# Patient Record
Sex: MALE | Race: WHITE | Employment: FULL TIME | URBAN - METROPOLITAN AREA
[De-identification: names, ages, dates, MRNs, and addresses within clinical notes are randomized per-mention and may not be internally consistent; named-entity substitution may affect disease eponyms.]

---

## 2018-07-06 ENCOUNTER — HOSPITAL ENCOUNTER (INPATIENT)
Facility: CLINIC | Age: 38
LOS: 4 days | Discharge: HOME | End: 2018-07-11
Attending: EMERGENCY MEDICINE | Admitting: PSYCHIATRY & NEUROLOGY
Payer: COMMERCIAL

## 2018-07-06 DIAGNOSIS — F10.220 ALCOHOL DEPENDENCE WITH UNCOMPLICATED INTOXICATION (H): ICD-10-CM

## 2018-07-06 LAB — ALCOHOL BREATH TEST: 0.32 (ref 0–0.01)

## 2018-07-06 PROCEDURE — 99285 EMERGENCY DEPT VISIT HI MDM: CPT | Performed by: EMERGENCY MEDICINE

## 2018-07-06 PROCEDURE — 82075 ASSAY OF BREATH ETHANOL: CPT | Performed by: EMERGENCY MEDICINE

## 2018-07-06 PROCEDURE — 99284 EMERGENCY DEPT VISIT MOD MDM: CPT | Mod: Z6 | Performed by: EMERGENCY MEDICINE

## 2018-07-06 NOTE — ED PROVIDER NOTES
History     Chief Complaint   Patient presents with     Alcohol Intoxication     Detox from alcohol, last drink prior to arrival, drinking vodka, 1/5 daily, has a bed on hold on 3A.   History was limited as patient is acutely intoxicated and somnolent.   HPI  Jorgito Florence is a 38 year old male with history of alcohol abuse who presents to the ED seeking detox from alcohol. Patient states he has been drinking vodka aproximately 1-2 L daily for weeks. He states he has been in detox previously, most recently in January in [place]. He denies any other medical problems.     PAST MEDICAL HISTORY  History reviewed. No pertinent past medical history.  PAST SURGICAL HISTORY  History reviewed. No pertinent surgical history.  FAMILY HISTORY  No family history on file.  SOCIAL HISTORY  Social History   Substance Use Topics     Smoking status: Current Every Day Smoker     Packs/day: 1.00     Smokeless tobacco: Not on file     Alcohol use Yes      Comment: 5th, bottle per day     MEDICATIONS  No current facility-administered medications for this encounter.      No current outpatient prescriptions on file.     ALLERGIES  Allergies   Allergen Reactions     Amoxicillin      Hydrocodone          I have reviewed the Medications, Allergies, Past Medical and Surgical History, and Social History in the Epic system.    Review of Systems   Unable to perform ROS: Mental status change       Physical Exam   BP: 136/90  Pulse: 116  Heart Rate: 116  Resp: 16  Weight: 59 kg (130 lb)  SpO2: 97 %      Physical Exam   Constitutional: He appears well-developed and well-nourished. No distress.   Heavily intoxicated and slurring his speech   HENT:   Head: Normocephalic and atraumatic.   Eyes: EOM are normal. Pupils are equal, round, and reactive to light. No scleral icterus.   Neck: Normal range of motion. Neck supple.   Cardiovascular: Tachycardia present.    Pulmonary/Chest: Effort normal.   Abdominal: Soft. There is no tenderness.   Skin: Skin is  warm and dry. No rash noted. He is not diaphoretic. No erythema. No pallor.       ED Course     ED Course     Procedures   6:48 PM  The patient was seen and examined by Dr. Bush in Room 9.                Critical Care time:  none             Labs Ordered and Resulted from Time of ED Arrival Up to the Time of Departure from the ED   ALCOHOL BREATH TEST POCT - Abnormal; Notable for the following:        Result Value    Alcohol Breath Test 0.316 (*)     All other components within normal limits   DRUG ABUSE SCREEN 6 CHEM DEP URINE ([facility])            Assessments & Plan (with Medical Decision Making)   This is a 38-year-old male patient presenting to the emergency room with complaints of alcohol abuse.  Currently he is significantly intoxicated with a blood alcohol of 0.316.  He is slurring his speech and is struggling to keep his eyes open.  He is otherwise in no significant distress with otherwise normal vital signs.  At this time he will be admitted to the detox service for continued care and management of alcohol abuse.    I have reviewed the nursing notes.    I have reviewed the findings, diagnosis, plan and need for follow up with the patient.    New Prescriptions    No medications on file       Final diagnoses:   Alcohol dependence with uncomplicated intoxication (H)     Darren ECHOLS, am serving as a trained medical scribe to document services personally performed by Raciel Bush MD, based on the provider's statements to me.      Raciel ECHOLS MD, was physically present and have reviewed and verified the accuracy of this note documented by Darren Castro.     7/6/2018   [facility], [place], EMERGENCY DEPARTMENT     Raciel Bush MD  07/06/18 8386

## 2018-07-06 NOTE — IP AVS SNAPSHOT
MRN:[id number]                      After Visit Summary   7/6/2018    Jorgito Florence    MRN: [id number]           Thank you!     Thank you for choosing [facility] for your care. Our goal is always to provide you with excellent care.        Patient Information     Date Of Birth          1980        Designated Caregiver       Most Recent Value    Caregiver    Will someone help with your care after discharge? no      About your hospital stay     You were admitted on:  July 7, 2018 You last received care in the:  [facility]    You were discharged on:  July 11, 2018       Who to Call     For medical emergencies, please call 911.  For non-urgent questions about your medical care, please call your primary care provider or clinic, None          Attending Provider     Provider Specialty    Raciel Bush MD Emergency Medicine    Yvette Wilson MD Psychiatry    Pavey, Lyle Alves MD Psychiatry       Primary Care Provider Fax #    Physician No Ref-Primary [phone]      Further instructions from your care team       Behavioral Discharge Planning and Instructions  THANK YOU FOR CHOOSING THE [address]  [phone]    Summary: You were admitted to Station 3A on 7/6/2018 for detoxification from alcohol.  A medical exam was performed that included lab work. You have met with a  and opted to begin [facility]'s  Treatment Program with sober housing in [place], [place].  Please take care and make your recovery a daily priority, Jorgito! It was a pleasure working with you and the treatment team wishes you the very best in your recovery! ~3A Treatment Team    Recommendation:  Outpatient Treatment, with sober housing, psychotherapy, sober support group engagement and active work with a sponsor or  through [facility] (see below for contact information).    Main Diagnosis:  Per Dr. Arteaga;  Alcohol use disorder severe    Major  Treatments, Procedures and Findings:  You were treated for alcohol detoxification using valium administered based on the [facility] protocol. You completed a chemical dependency assessment on 07/10/2018 with your , Matteo. You had labs drawn and those results were reviewed with you. Please take a copy of your lab work with you to your next primary care physician appointment.    Symptoms to Report:  If you experience more anxiety, confusion, sleeplessness, deep sadness or thoughts of suicide, notify your treatment team or notify your primary care physician. IF ANY OF THE SYMPTOMS YOU ARE EXPERIENCING ARE A MEDICAL EMERGENCY CALL 911 IMMEDIATELY.     Lifestyle Adjustment: Adjust your lifestyle to get enough sleep, relaxation, exercise and good nutrition. Continue to develop healthy coping skills to decrease stress and promote a sober living environment. Do not use mood altering substances including alcohol, illegal drugs or addictive medications other than what is currently prescribed.   Health Action Plan:  1.Create a daily schedule  2. Eat Healthy  3. Plan Enjoyable Sober Activities  4. Use Problem Solving Skills and Deal with Issues as they Arise.   5. Be Physically Active  6. Take your medications as prescribed  7. Get enough restful sleep  8. Practice Relaxation  9. Spend time with Supportive People  10. No use of alcohol, illegal drugs or addictive medications other than what is currently prescribed.   11.AA, NA Sponsor are excellent resources for support      Disposition: Pt discharged to [facility] Treatment Program with sober housing.    Follow-up Appointment:   Please make a follow up appointment to get a new order from your primary care provider if any prescriptions need to be refilled. We only send you home with a 30 day supply. You stated that you see Dr. Katz at [facility], his contact information is below:  Dr. Katz~ [address], [place] [address]  Phone: [phone]  [phone]    Resources:   AA/NA and Sponsors are excellent resources for support and you can find one at any support group meeting.   SMART Recovery - self management for addiction recovery:  www.smartrecovery.org  http://www.aastpaul.org/?topic=8  http://a[place].org/meetings/  http://www.nam[place].org/index.php/meeting-list-pdf  Pathways ~ A Health Crisis Resource & Support Center:  692.655.2862.  http://www.harmreduction.org  [facility] 390-684-9157  Support Group:  AA/NA and Sponsor/support.  National [place] on Mental Illness (www.[place].guille.org): 341.623.5646 or 152-931-7868.  Alcoholics Anonymous (www.alcoholics-anonymous.org): Check your phone book for your local chapter.  Suicide Awareness Voices of Education (SAVE) (www.save.org): 923-943-FQCJ (4783)  National Suicide Prevention Line (www.mentalhealthmn.org): 267-913-HQMS (9106)  Mental Health Consumer/Survivor Network of [place] (www.mhcsn.net): 481.900.6566 or 985-101-3139  Mental Health Association of [place] (www.mentalhealth.org): 777.420.8391 or 131-879-7462   Substance Abuse and Mental Health Services (www.samhsa.gov)    [facility] ([facility])  [facility] connects people seeking recovery to resources that help foster and sustain long-term recovery.  Whether you are seeking resources for treatment, transportation, housing, job training, education, health care or other pathways to recovery, [facility] is a great place to start.  680.877.8805.  www.[facility].org    General Medication Instructions:   See your medication sheet(s) for instructions.   Take all medicines as directed.  Make no changes unless your doctor suggests them.   Go to all your doctor visits.  Be sure to have all your required lab tests. This way, your medicines can be refilled on time.  Do not use any forms of alcohol.    Please Note:  If you have any questions at anytime after you are discharged please call the [facility]  "unit 3AW unit at [phone].  [facility], Behavioral Intake [phone]  Please take this discharge folder with you to all your follow up appointments, it contains your lab results, diagnosis, medication list and discharge recommendations.      THANK YOU FOR CHOOSING THE [facility]       Pending Results     No orders found from 2018 to 2018.            Statement of Approval     Ordered          18 0828  I have reviewed and agree with all the recommendations and orders detailed in this document.  EFFECTIVE NOW     Approved and electronically signed by:  Lyle Warren MD             Admission Information     Date & Time Provider Department Dept. Phone    2018 Lyle Warren MD [facility] [phone]      Your Vitals Were     Blood Pressure Pulse Temperature Respirations Height Weight    146/93 97 97.5  F (36.4  C) (Oral) 16 1.753 m (5' 9\") 61.2 kg (135 lb)    Pulse Oximetry BMI (Body Mass Index)                97% 19.94 kg/m2          [employer] Information     [employer] lets you send messages to your doctor, view your test results, renew your prescriptions, schedule appointments and more. To sign up, go to [email or web address] . Click on \"Log in\" on the left side of the screen, which will take you to the Welcome page. Then click on \"Sign up Now\" on the right side of the page.     You will be asked to enter the access code listed below, as well as some personal information. Please follow the directions to create your username and password.     Your access code is: [email or web address]  Expires: 10/9/2018  1:03 PM     Your access code will  in 90 days. If you need help or a new code, please call your [facility] clinic or [phone].        Care EveryWhere ID     This is your Care EveryWhere ID. This could be used by other organizations to access your [facility] medical records  [id number]        Equal Access to Services     " DONTA MARIE : Hadii aad ku jackie Sobernard, waaxda luqadaha, qaybta kaalmada adeegyada, jaziel rocco haydemarcus cancinomikesotero mantilla. So [facility] [phone].    ATENCIÓN: Si habla español, tiene a cruz disposición servicios gratuitos de asistencia lingüística. Llame al [phone].    We comply with applicable federal civil rights laws and [place] laws. We do not discriminate on the basis of race, color, national origin, age, disability, sex, sexual orientation, or gender identity.               Review of your medicines      START taking        Dose / Directions    QUEtiapine 100 MG tablet   Commonly known as:  SEROquel        Dose:  100 mg   Take 1 tablet (100 mg) by mouth nightly as needed (sleep)   Quantity:  30 tablet   Refills:  0         CONTINUE these medicines which have NOT CHANGED        Dose / Directions    ONE-A-DAY MENS Tabs        Dose:  1 tablet   Take 1 tablet by mouth daily   Refills:  0            Where to get your medicines      These medications were sent to [facility] [address]  [address] [address]     Phone:  [phone]     QUEtiapine 100 MG tablet                Protect others around you: Learn how to safely use, store and throw away your medicines at www.disposemymeds.org.             Medication List: This is a list of all your medications and when to take them. Check marks below indicate your daily home schedule. Keep this list as a reference.      Medications           Morning Afternoon Evening Bedtime As Needed    ONE-A-DAY MENS Tabs   Take 1 tablet by mouth daily   Last time this was given:  1 tablet on 7/11/2018  8:01 AM                                QUEtiapine 100 MG tablet   Commonly known as:  SEROquel   Take 1 tablet (100 mg) by mouth nightly as needed (sleep)   Last time this was given:  100 mg on 7/10/2018 10:03 PM

## 2018-07-06 NOTE — IP AVS SNAPSHOT
[facility]    [address] [place] [address]    Phone:  [phone]                                       After Visit Summary   7/6/2018    Jorgito Florence    MRN: [id number]           After Visit Summary Signature Page     I have received my discharge instructions, and my questions have been answered. I have discussed any challenges I see with this plan with the nurse or doctor.    ..........................................................................................................................................  Patient/Patient Representative Signature      ..........................................................................................................................................  Patient Representative Print Name and Relationship to Patient    ..................................................               ................................................  Date                                            Time    ..........................................................................................................................................  Reviewed by Signature/Title    ...................................................              ..............................................  Date                                                            Time

## 2018-07-07 LAB
AMPHETAMINES UR QL SCN: NEGATIVE
BARBITURATES UR QL: NEGATIVE
BENZODIAZ UR QL: NEGATIVE
CANNABINOIDS UR QL SCN: NEGATIVE
COCAINE UR QL: NEGATIVE
ETHANOL UR QL SCN: POSITIVE
OPIATES UR QL SCN: NEGATIVE

## 2018-07-07 PROCEDURE — 80307 DRUG TEST PRSMV CHEM ANLYZR: CPT | Performed by: FAMILY MEDICINE

## 2018-07-07 PROCEDURE — 25000132 ZZH RX MED GY IP 250 OP 250 PS 637: Performed by: PSYCHIATRY & NEUROLOGY

## 2018-07-07 PROCEDURE — HZ2ZZZZ DETOXIFICATION SERVICES FOR SUBSTANCE ABUSE TREATMENT: ICD-10-PCS | Performed by: EMERGENCY MEDICINE

## 2018-07-07 PROCEDURE — 25000132 ZZH RX MED GY IP 250 OP 250 PS 637: Performed by: EMERGENCY MEDICINE

## 2018-07-07 PROCEDURE — 12800012 ZZH R&B CD MH INTERMEDIATE ADULT

## 2018-07-07 PROCEDURE — 80320 DRUG SCREEN QUANTALCOHOLS: CPT | Performed by: FAMILY MEDICINE

## 2018-07-07 RX ORDER — DIAZEPAM 5 MG
5-20 TABLET ORAL EVERY 30 MIN PRN
Status: DISCONTINUED | OUTPATIENT
Start: 2018-07-07 | End: 2018-07-07

## 2018-07-07 RX ORDER — DIAZEPAM 5 MG
5 TABLET ORAL ONCE
Status: COMPLETED | OUTPATIENT
Start: 2018-07-07 | End: 2018-07-07

## 2018-07-07 RX ORDER — ACETAMINOPHEN 325 MG/1
650 TABLET ORAL EVERY 4 HOURS PRN
Status: DISCONTINUED | OUTPATIENT
Start: 2018-07-07 | End: 2018-07-11 | Stop reason: HOSPADM

## 2018-07-07 RX ORDER — ALUMINA, MAGNESIA, AND SIMETHICONE 2400; 2400; 240 MG/30ML; MG/30ML; MG/30ML
30 SUSPENSION ORAL EVERY 4 HOURS PRN
Status: DISCONTINUED | OUTPATIENT
Start: 2018-07-07 | End: 2018-07-11 | Stop reason: HOSPADM

## 2018-07-07 RX ORDER — FOLIC ACID 1 MG/1
1 TABLET ORAL DAILY
Status: DISCONTINUED | OUTPATIENT
Start: 2018-07-08 | End: 2018-07-11 | Stop reason: HOSPADM

## 2018-07-07 RX ORDER — HYDROXYZINE HYDROCHLORIDE 25 MG/1
25 TABLET, FILM COATED ORAL EVERY 4 HOURS PRN
Status: DISCONTINUED | OUTPATIENT
Start: 2018-07-07 | End: 2018-07-11 | Stop reason: HOSPADM

## 2018-07-07 RX ORDER — LANOLIN ALCOHOL/MO/W.PET/CERES
100 CREAM (GRAM) TOPICAL DAILY
Status: COMPLETED | OUTPATIENT
Start: 2018-07-08 | End: 2018-07-10

## 2018-07-07 RX ORDER — LOPERAMIDE HCL 2 MG
2 CAPSULE ORAL 4 TIMES DAILY PRN
Status: DISCONTINUED | OUTPATIENT
Start: 2018-07-07 | End: 2018-07-11 | Stop reason: HOSPADM

## 2018-07-07 RX ORDER — ATENOLOL 50 MG/1
50 TABLET ORAL DAILY PRN
Status: DISCONTINUED | OUTPATIENT
Start: 2018-07-07 | End: 2018-07-08

## 2018-07-07 RX ORDER — MULTIVIT-MIN/FOLIC/VIT K/LYCOP 400-300MCG
1 TABLET ORAL DAILY
COMMUNITY

## 2018-07-07 RX ORDER — MULTIVITAMIN,THERAPEUTIC
TABLET ORAL DAILY
Status: DISCONTINUED | OUTPATIENT
Start: 2018-07-08 | End: 2018-07-11 | Stop reason: HOSPADM

## 2018-07-07 RX ORDER — TRAZODONE HYDROCHLORIDE 50 MG/1
50 TABLET, FILM COATED ORAL
Status: DISCONTINUED | OUTPATIENT
Start: 2018-07-07 | End: 2018-07-08

## 2018-07-07 RX ORDER — ONDANSETRON 4 MG/1
4 TABLET, ORALLY DISINTEGRATING ORAL EVERY 6 HOURS PRN
Status: DISCONTINUED | OUTPATIENT
Start: 2018-07-07 | End: 2018-07-11 | Stop reason: HOSPADM

## 2018-07-07 RX ORDER — DIAZEPAM 5 MG
5-20 TABLET ORAL EVERY 30 MIN PRN
Status: DISCONTINUED | OUTPATIENT
Start: 2018-07-07 | End: 2018-07-11 | Stop reason: HOSPADM

## 2018-07-07 RX ADMIN — DIAZEPAM 5 MG: 5 TABLET ORAL at 12:46

## 2018-07-07 RX ADMIN — NICOTINE POLACRILEX 8 MG: 4 GUM, CHEWING ORAL at 22:06

## 2018-07-07 RX ADMIN — HYDROXYZINE HYDROCHLORIDE 25 MG: 25 TABLET ORAL at 22:06

## 2018-07-07 RX ADMIN — NICOTINE POLACRILEX 8 MG: 4 GUM, CHEWING ORAL at 20:23

## 2018-07-07 RX ADMIN — DIAZEPAM 10 MG: 5 TABLET ORAL at 16:27

## 2018-07-07 RX ADMIN — NICOTINE POLACRILEX 8 MG: 4 GUM, CHEWING ORAL at 16:27

## 2018-07-07 RX ADMIN — DIAZEPAM 10 MG: 5 TABLET ORAL at 20:23

## 2018-07-07 RX ADMIN — TRAZODONE HYDROCHLORIDE 50 MG: 50 TABLET ORAL at 22:06

## 2018-07-07 ASSESSMENT — ACTIVITIES OF DAILY LIVING (ADL)
ORAL_HYGIENE: INDEPENDENT
DRESS: INDEPENDENT
GROOMING: INDEPENDENT

## 2018-07-07 NOTE — PHARMACY-ADMISSION MEDICATION HISTORY
Admission Medication History status for the 7/6/2018 admission is complete.  See EPIC admission navigator for Prior to Admission medications.    Medication history sources:  Patient        Medication history source reliability: Good    Medication adherence:  Good    Changes made to PTA medication list (reason)  Added: One-A-Day Mens Multivitamin  Deleted: None  Changed: None    Additional medication history information (including reliability of information, actions taken by pharmacist):   Patient was a good historian. Reported history of taking gabapentin, buspirone, and lexapro but he discontinued those back in February. The only thing he currently takes is the multivite.     Time spent in this activity: 10 minutes     Medication history completed by: ROB Glover     Prior to Admission medications    Medication Sig Last Dose Taking? Auth Provider   Multiple Vitamin (ONE-A-DAY MENS) TABS Take 1 tablet by mouth daily  Yes Unknown, Entered By History

## 2018-07-07 NOTE — PHARMACY-ADMISSION MEDICATION HISTORY
Admission medication history for the July 6, 2018 admission is complete.     Interview sources:  patient, [facility]    Reliability of source: poor: patient slurring words and unable to answer most questions    Medication compliance: unknown    Changes made to PTA medication list (reason)  Added: None  Deleted:     Ibuprofen 600 mg tablet: Take 1 tablet by mouth every 6 hours as needed.  -prescription from 2011    Trazodone 50 mg tablet: Take 1 tablet by mouth at bedtime.  -prescription from 2011    Changed: None    Additional medication history information:     Called [facility] in [place], [place] ([phone]). They had medication history from a [facility] in [place].  Medications last filled 1/24/2018: citalopram 20 mg once daily, buspirone 30 mg three times daily, gabapentin 100 mg three times daily    Prior to Admission medications    Not on File       Time spent: 15 minutes    Medication history completed by: Luisana Muse

## 2018-07-07 NOTE — PROGRESS NOTES
07/07/18 1419   Patient Belongings   Did you bring any home meds/supplements to the hospital?  No   Patient Belongings clothing   Disposition of Belongings Kept with patient;Locker;Sent to security per site process;Other (see comment)   Belongings Search Yes   Clothing Search Yes   Second Staff Wes GOLDMAN, Anthony GOLDMAN     Belt in storage    Cell, , wallet in locked drawer    2 lighters, cigs in sharps    [place] DL, EBT, Visa, medical card, MC    A               Admission:  I am responsible for any personal items that are not sent to the safe or pharmacy.  [facility] is not responsible for loss, theft or damage of any property in my possession.    Signature:  _________________________________ Date: _______  Time: _____                                              Staff Signature:  ____________________________ Date: ________  Time: _____      2nd Staff person, if patient is unable/unwilling to sign:    Signature: ________________________________ Date: ________  Time: _____     Discharge:  [facility] has returned all of my personal belongings:    Signature: _________________________________ Date: ________  Time: _____                                          Staff Signature:  ____________________________ Date: ________  Time: _____

## 2018-07-07 NOTE — PLAN OF CARE
Problem: Substance Withdrawal  Goal: Substance Withdrawal  Signs and symptoms of listed problems will be absent or manageable.   Outcome: Declining    Pt presents to Station 3A for treatment of alcohol withdrawal.Pt states he relapsed a month ago after 6 months of sobriety.  Pt reports drinking a liter of vodka per day x 3 weeks with last use being yesterday. Pt unsure of discharge plan. He has been through multiple treatments in the past and this was a short relapse.  Denies SI/SIB.  MSSA upon arrival = 7. Pt will be monitored for signs and symptoms of alcohol withdrawal and will be treated with the appropriate protocols.

## 2018-07-07 NOTE — ED NOTES
ED to Behavioral Floor Handoff    SITUATION  Jorgito Florence is a 38 year old male who speaks English and lives in a home alone The patient arrived in the ED by private car from home with a complaint of Alcohol Intoxication (Detox from alcohol, last drink prior to arrival, drinking vodka, 1/5 daily, has a bed on hold on 3A.)  .The patient's current symptoms started/worsened 1 month(s) ago and during this time the symptoms have remained the same.   In the ED, pt was diagnosed with   Final diagnoses:   Alcohol dependence with uncomplicated intoxication (H)        Initial vitals were: BP: 136/90  Pulse: 116  Heart Rate: 116  Temp: 98.1  F (36.7  C)  Resp: 16  Weight: 59 kg (130 lb)  SpO2: 97 %   --------  Is the patient diabetic? No   If yes, last blood glucose? --     If yes, was this treated in the ED? --  --------  Is the patient inebriated (ETOH) Yes or Impaired on other substances? No  MSSA done? N/A  Last MSSA score: --    Were withdrawal symptoms treated? N/A  Does the patient have a seizure history? No. If yes, date of most recent seizure--  --------  Is the patient patient experiencing suicidal ideation? denies current or recent suicidal ideation     Homicidal ideation? denies current or recent homicidal ideation or behaviors.    Self-injurious behavior/urges? denies current or recent self injurious behavior or ideation.  ------  Was pt aggressive in the ED No  Was a code called No  Is the pt now cooperative? Yes  -------  Meds given in ED: Medications - No data to display   Family present during ED course? No  Family currently present? No    BACKGROUND  Does the patient have a cognitive impairment or developmental disability? No  Allergies:   Allergies   Allergen Reactions     Amoxicillin      Hydrocodone    .   Social demographics are   Social History     Social History     Marital status: Single     Spouse name: N/A     Number of children: N/A     Years of education: N/A     Social History Main Topics      Smoking status: Current Every Day Smoker     Packs/day: 1.00     Smokeless tobacco: None     Alcohol use Yes      Comment: 5th, bottle per day     Drug use: No     Sexual activity: Yes     Partners: Female     Other Topics Concern     None     Social History Narrative        ASSESSMENT  Labs results   Labs Ordered and Resulted from Time of ED Arrival Up to the Time of Departure from the ED   DRUG ABUSE SCREEN 6 CHEM DEP URINE ([facility]) - Abnormal; Notable for the following:        Result Value    Ethanol Qual Urine Positive (*)     All other components within normal limits   ALCOHOL BREATH TEST POCT - Abnormal; Notable for the following:     Alcohol Breath Test 0.316 (*)     All other components within normal limits      Imaging Studies: No results found for this or any previous visit (from the past 24 hour(s)).   Most recent vital signs /85 (BP Location: Right arm)  Pulse 81  Temp 98.1  F (36.7  C) (Oral)  Resp 18  Wt 59 kg (130 lb)  SpO2 97%  BMI 19.2 kg/m2   Abnormal labs/tests/findings requiring intervention:---   Pain control: pt had none  Nausea control: pt had none    RECOMMENDATION  Are any infection precautions needed (MRSA, VRE, etc.)? No If yes, what infection? --  ---  Does the patient have mobility issues? independently. If yes, what device does the pt use? ---  ---  Is patient on 72 hour hold or commitment? No If on 72 hour hold, have hold and rights been given to patient? No  Are admitting orders written if after 10 p.m. ?N/A  Tasks needing to be completed:---     Ashley gaytan--    [phone] [facility]   [phone] [facility]

## 2018-07-08 LAB
ALBUMIN SERPL-MCNC: 3.1 G/DL (ref 3.4–5)
ALP SERPL-CCNC: 84 U/L (ref 40–150)
ALT SERPL W P-5'-P-CCNC: 49 U/L (ref 0–70)
ANION GAP SERPL CALCULATED.3IONS-SCNC: 6 MMOL/L (ref 3–14)
AST SERPL W P-5'-P-CCNC: 36 U/L (ref 0–45)
BILIRUB SERPL-MCNC: 0.6 MG/DL (ref 0.2–1.3)
BUN SERPL-MCNC: 14 MG/DL (ref 7–30)
CALCIUM SERPL-MCNC: 8.5 MG/DL (ref 8.5–10.1)
CHLORIDE SERPL-SCNC: 109 MMOL/L (ref 94–109)
CHOLEST SERPL-MCNC: 201 MG/DL
CO2 SERPL-SCNC: 29 MMOL/L (ref 20–32)
CREAT SERPL-MCNC: 0.78 MG/DL (ref 0.66–1.25)
ERYTHROCYTE [DISTWIDTH] IN BLOOD BY AUTOMATED COUNT: 13.1 % (ref 10–15)
GFR SERPL CREATININE-BSD FRML MDRD: >90 ML/MIN/1.7M2
GGT SERPL-CCNC: 47 U/L (ref 0–75)
GLUCOSE SERPL-MCNC: 96 MG/DL (ref 70–99)
HCT VFR BLD AUTO: 46.1 % (ref 40–53)
HDLC SERPL-MCNC: 86 MG/DL
HGB BLD-MCNC: 15.7 G/DL (ref 13.3–17.7)
LDLC SERPL CALC-MCNC: 86 MG/DL
MCH RBC QN AUTO: 32.8 PG (ref 26.5–33)
MCHC RBC AUTO-ENTMCNC: 34.1 G/DL (ref 31.5–36.5)
MCV RBC AUTO: 96 FL (ref 78–100)
NONHDLC SERPL-MCNC: 115 MG/DL
PLATELET # BLD AUTO: 169 10E9/L (ref 150–450)
POTASSIUM SERPL-SCNC: 4.2 MMOL/L (ref 3.4–5.3)
PROT SERPL-MCNC: 6.2 G/DL (ref 6.8–8.8)
RBC # BLD AUTO: 4.79 10E12/L (ref 4.4–5.9)
SODIUM SERPL-SCNC: 144 MMOL/L (ref 133–144)
TRIGL SERPL-MCNC: 143 MG/DL
TSH SERPL DL<=0.005 MIU/L-ACNC: 0.47 MU/L (ref 0.4–4)
VIT B12 SERPL-MCNC: 446 PG/ML (ref 193–986)
WBC # BLD AUTO: 6.4 10E9/L (ref 4–11)

## 2018-07-08 PROCEDURE — 25000132 ZZH RX MED GY IP 250 OP 250 PS 637: Performed by: PSYCHIATRY & NEUROLOGY

## 2018-07-08 PROCEDURE — 80053 COMPREHEN METABOLIC PANEL: CPT | Performed by: PSYCHIATRY & NEUROLOGY

## 2018-07-08 PROCEDURE — 82607 VITAMIN B-12: CPT | Performed by: PSYCHIATRY & NEUROLOGY

## 2018-07-08 PROCEDURE — 36415 COLL VENOUS BLD VENIPUNCTURE: CPT | Performed by: PSYCHIATRY & NEUROLOGY

## 2018-07-08 PROCEDURE — 99232 SBSQ HOSP IP/OBS MODERATE 35: CPT | Performed by: PHYSICIAN ASSISTANT

## 2018-07-08 PROCEDURE — 99222 1ST HOSP IP/OBS MODERATE 55: CPT | Mod: AI | Performed by: PSYCHIATRY & NEUROLOGY

## 2018-07-08 PROCEDURE — 80061 LIPID PANEL: CPT | Performed by: PSYCHIATRY & NEUROLOGY

## 2018-07-08 PROCEDURE — 12800012 ZZH R&B CD MH INTERMEDIATE ADULT

## 2018-07-08 PROCEDURE — 85027 COMPLETE CBC AUTOMATED: CPT | Performed by: PSYCHIATRY & NEUROLOGY

## 2018-07-08 PROCEDURE — 82977 ASSAY OF GGT: CPT | Performed by: PSYCHIATRY & NEUROLOGY

## 2018-07-08 PROCEDURE — 84443 ASSAY THYROID STIM HORMONE: CPT | Performed by: PSYCHIATRY & NEUROLOGY

## 2018-07-08 RX ORDER — QUETIAPINE FUMARATE 100 MG/1
100 TABLET, FILM COATED ORAL
Status: DISCONTINUED | OUTPATIENT
Start: 2018-07-08 | End: 2018-07-11 | Stop reason: HOSPADM

## 2018-07-08 RX ORDER — QUETIAPINE FUMARATE 100 MG/1
100 TABLET, FILM COATED ORAL AT BEDTIME
Status: DISCONTINUED | OUTPATIENT
Start: 2018-07-08 | End: 2018-07-08

## 2018-07-08 RX ADMIN — MULTIPLE VITAMINS W/ MINERALS TAB 1 TABLET: TAB at 08:34

## 2018-07-08 RX ADMIN — Medication 100 MG: at 08:34

## 2018-07-08 RX ADMIN — NICOTINE POLACRILEX 8 MG: 4 GUM, CHEWING ORAL at 16:15

## 2018-07-08 RX ADMIN — DIAZEPAM 10 MG: 5 TABLET ORAL at 16:15

## 2018-07-08 RX ADMIN — DIAZEPAM 10 MG: 5 TABLET ORAL at 20:06

## 2018-07-08 RX ADMIN — QUETIAPINE FUMARATE 100 MG: 100 TABLET ORAL at 22:04

## 2018-07-08 RX ADMIN — NICOTINE POLACRILEX 8 MG: 4 GUM, CHEWING ORAL at 14:25

## 2018-07-08 RX ADMIN — FOLIC ACID 1 MG: 1 TABLET ORAL at 08:34

## 2018-07-08 RX ADMIN — NICOTINE POLACRILEX 8 MG: 4 GUM, CHEWING ORAL at 20:06

## 2018-07-08 ASSESSMENT — ACTIVITIES OF DAILY LIVING (ADL)
GROOMING: INDEPENDENT
ORAL_HYGIENE: INDEPENDENT
DRESS: INDEPENDENT

## 2018-07-08 NOTE — PROGRESS NOTES
Patient has been out and about on the unit. He is alert, pleasant, cooperative. Safe and steady gait. MSSA/alcohol withdrawal scores this justin: 8 and 8. Received Valium 10 mg for each score. He states that the Valium is effective for him. Good appetite, social and out and about on the unit. He denies any outstanding areas of concern this justin. Appropriate with his interactions.
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
## 2018-07-08 NOTE — CONSULTS
"  [facility]  Internal Medicine Consult     Jorgito Florence MRN# [id number]   Age: 38 year old YOB: 1980     Date of Admission: 7/6/2018  Date of Consult:  7/8/2018    Primary Care Provider: No Ref-Primary, Physician    Requesting Service: Psychiatry  Reason for Consult: alcohol abuse      SUBJECTIVE   CC:   Alcohol abuse   HPI:   Jorgito Florence is a 39yo male with a hx of alcohol abuse who was admitted to [address] detox seeking detox from alcohol. Patient has been drinking 1-2L of hard alcohol daily. He has a hx of substance abuse and last underwent treatment [place] of state. Pt denies any hx of seizures from withdrawal. He reports his withdrawal symptoms are improving at this time. He reports being frustrated with his relapses of alcohol. He denies any hx of cardiac or pulmonary disease. No chest pain, sob, or dyspnea. No fevers or chills. No abdominal pain, bowel or bladder concerns.      Past Medical History:   Alcohol abuse  Tobacco abuse     Past Surgical History:    History reviewed. No pertinent surgical history.      Social History:   See HPI.      Family History:   Reviewed - non contributory     Allergies:     Allergies   Allergen Reactions     Amoxicillin      Hydrocodone          Medications:   Reviewed. Please see MAR     Review of Systems:   10 point ROS of systems including Constitutional, Eyes, Respiratory, Cardiovascular, Gastroenterology, Genitourinary, Integumentary, Muscularskeletal, Psychiatric were all negative except for pertinent positives noted in my HPI.      OBJECTIVE   Physical Exam:   Vitals were reviewed  Blood pressure 104/57, pulse 53, temperature 97  F (36.1  C), temperature source Oral, resp. rate 16, height 1.753 m (5' 9\"), weight 61.2 kg (135 lb), SpO2 97 %.  General:alert, NAD, appears in mild withdrawal, pleasant and cooperative  HEENT: MMM  Cardiovascular: RRR  Lungs:CTAB  Abdomen: + BS, soft with no distention and no tenderness "   Vascular: no peripheral edema, distal pulses palpable  Neurologic: AAO X 3, no focal deficits, no tremors in UE  Skin: no jaundice, rashes, or lesions on exposed areas of skin         Data:    Labs reviewed     Assessment and Plan/Recommendations:   Jorgito Florence is a 37yo male with a hx of alcohol abuse who was admitted to [address] detox seeking detox from alcohol.    Alcohol abuse and withdrawal. No hx of seizures from withdrawal. Has been in and out of treatment several times. LFTs and Plt wnl. Cont MSSA, thiamine and folic as per psychiatry, primary team.     Tobacco abuse. Smokes 1.5ppd. Cont nicorette gum.     Currently, medically stable and I will be happy to follow up and see again for any intercurrent medical issues. Thank you for the opportunity to be a part of this patient's care.      Tonia Dodson  Internal Medicine [facility] Hospitalist  [phone]  July 8, 2018

## 2018-07-09 PROBLEM — F10.10 ALCOHOL ABUSE: Status: ACTIVE | Noted: 2018-07-09

## 2018-07-09 PROCEDURE — 99207 ZZC CDG-MDM COMPONENT: MEETS LOW - DOWN CODED: CPT | Performed by: PSYCHIATRY & NEUROLOGY

## 2018-07-09 PROCEDURE — 25000132 ZZH RX MED GY IP 250 OP 250 PS 637: Performed by: PSYCHIATRY & NEUROLOGY

## 2018-07-09 PROCEDURE — 99232 SBSQ HOSP IP/OBS MODERATE 35: CPT | Performed by: PSYCHIATRY & NEUROLOGY

## 2018-07-09 PROCEDURE — 12800012 ZZH R&B CD MH INTERMEDIATE ADULT

## 2018-07-09 PROCEDURE — 25000132 ZZH RX MED GY IP 250 OP 250 PS 637: Performed by: NURSE PRACTITIONER

## 2018-07-09 RX ORDER — MULTIPLE VITAMINS W/ MINERALS TAB 9MG-400MCG
1 TAB ORAL DAILY
Status: DISCONTINUED | OUTPATIENT
Start: 2018-07-09 | End: 2018-07-09

## 2018-07-09 RX ORDER — DIAZEPAM 5 MG
5-20 TABLET ORAL EVERY 30 MIN PRN
Status: DISCONTINUED | OUTPATIENT
Start: 2018-07-09 | End: 2018-07-09

## 2018-07-09 RX ORDER — OLANZAPINE 5 MG/1
10 TABLET ORAL
Status: DISCONTINUED | OUTPATIENT
Start: 2018-07-09 | End: 2018-07-09

## 2018-07-09 RX ORDER — ACETAMINOPHEN 325 MG/1
650 TABLET ORAL EVERY 4 HOURS PRN
Status: DISCONTINUED | OUTPATIENT
Start: 2018-07-09 | End: 2018-07-09

## 2018-07-09 RX ORDER — OLANZAPINE 10 MG/2ML
10 INJECTION, POWDER, FOR SOLUTION INTRAMUSCULAR
Status: DISCONTINUED | OUTPATIENT
Start: 2018-07-09 | End: 2018-07-09

## 2018-07-09 RX ORDER — FOLIC ACID 1 MG/1
1 TABLET ORAL DAILY
Status: DISCONTINUED | OUTPATIENT
Start: 2018-07-09 | End: 2018-07-09

## 2018-07-09 RX ORDER — HYDROXYZINE HYDROCHLORIDE 25 MG/1
25 TABLET, FILM COATED ORAL EVERY 4 HOURS PRN
Status: DISCONTINUED | OUTPATIENT
Start: 2018-07-09 | End: 2018-07-09

## 2018-07-09 RX ORDER — ATENOLOL 50 MG/1
50 TABLET ORAL DAILY PRN
Status: DISCONTINUED | OUTPATIENT
Start: 2018-07-09 | End: 2018-07-11 | Stop reason: HOSPADM

## 2018-07-09 RX ADMIN — NICOTINE POLACRILEX 8 MG: 4 GUM, CHEWING ORAL at 16:17

## 2018-07-09 RX ADMIN — HYDROXYZINE HYDROCHLORIDE 25 MG: 25 TABLET ORAL at 11:20

## 2018-07-09 RX ADMIN — DIAZEPAM 10 MG: 5 TABLET ORAL at 08:32

## 2018-07-09 RX ADMIN — ATENOLOL 50 MG: 50 TABLET ORAL at 13:01

## 2018-07-09 RX ADMIN — QUETIAPINE FUMARATE 100 MG: 100 TABLET ORAL at 22:15

## 2018-07-09 RX ADMIN — Medication 100 MG: at 08:32

## 2018-07-09 RX ADMIN — NICOTINE POLACRILEX 8 MG: 4 GUM, CHEWING ORAL at 08:32

## 2018-07-09 RX ADMIN — DIAZEPAM 10 MG: 5 TABLET ORAL at 11:19

## 2018-07-09 RX ADMIN — MULTIPLE VITAMINS W/ MINERALS TAB 1 TABLET: TAB at 08:32

## 2018-07-09 RX ADMIN — NICOTINE POLACRILEX 8 MG: 4 GUM, CHEWING ORAL at 20:32

## 2018-07-09 RX ADMIN — DIAZEPAM 5 MG: 5 TABLET ORAL at 20:30

## 2018-07-09 RX ADMIN — HYDROXYZINE HYDROCHLORIDE 25 MG: 25 TABLET ORAL at 22:15

## 2018-07-09 RX ADMIN — FOLIC ACID 1 MG: 1 TABLET ORAL at 08:32

## 2018-07-09 ASSESSMENT — ACTIVITIES OF DAILY LIVING (ADL)
GROOMING: INDEPENDENT
ORAL_HYGIENE: INDEPENDENT
LAUNDRY: WITH SUPERVISION
DRESS: STREET CLOTHES

## 2018-07-09 NOTE — PLAN OF CARE
Behavioral Team Discussion: (7/9/2018)    Continued Stay Criteria/Rationale: Patient admitted for Alcohol Use Disorder.  Plan: The following services will be provided to the patient; psychiatric assessment, medication management, therapeutic milieu, individual and group support, and skills groups.   Participants: 3A Provider: Dr. Yvette Wilson MD; 3A RN's: Gabe Wagner, RN, Freddie Kearns, RN and Makenna Poole, RN; 3A CM's: OLIVIA Vogel.  Summary/Recommendation: Providers will assess today for treatment recommendations, discharge planning, and aftercare plans. CM will meet with pt for discharge planning.   Medical/Physical: Deferred (see medical notes).  Precautions:   Behavioral Orders   Procedures     Code 1 - Restrict to Unit     Routine Programming     As clinically indicated     Status 15     Every 15 minutes.     Withdrawal precautions     Rationale for change in precautions or plan: N/A  Progress: No Change.

## 2018-07-09 NOTE — PROGRESS NOTES
Patient has been out and about on the unit. He is alert, pleasant, cooperative. He attended the AA Meeting. His MSSA/alcohol withdrawal scores: 8 and 8 and 10 mg Valium was given for each of those scores. He talks about being clean and sober and that he has had long periods of sobriety in the past. Denies SI/SIB.

## 2018-07-09 NOTE — PROGRESS NOTES
"CLINICAL NUTRITION SERVICES - ASSESSMENT NOTE     Nutrition Prescription    RECOMMENDATIONS FOR MDs/PROVIDERS TO ORDER:  None today    Malnutrition Status:    Patient does not meet two of the above criteria necessary for diagnosing malnutrition    Recommendations already ordered by Registered Dietitian (RD):  -Ordered Boost Plus vanilla at 2 pm and HS daily per pt agreement.    Future/Additional Recommendations:  -Encourage continued good intakes of tid meals with snacks between as desired plus supplements.  -Monitor po intakes and weight trends.     REASON FOR ASSESSMENT  Jorgito Florence is a/an 38 year old male assessed by the dietitian for Admission Nutrition Risk Screen for unintentional loss of 10# or more in the past two months    NUTRITION HISTORY  Per chart review, pt \"with a past medical history of alcohol use, possible depression was admitted 7/6/2018 for alcohol detoxification.\"    Also noted:  \"Pt states he relapsed a month ago after 6 months of sobriety.  Pt reports drinking a liter of vodka per day x 3 weeks with last use being yesterday.\"    Per pt report today when drinking-\"I don't eat.\"  Reports eating only every 2-3 days for the past several weeks.      CURRENT NUTRITION ORDERS  Diet: Regular  Intake/Tolerance:  Good appetite noted.  Per pt he is eating all of his meals plus some snacks from unit.      LABS  Labs reviewed    MEDICATIONS  Medications reviewed  MVI  Thiamine   Folic Acid    ANTHROPOMETRICS  Height: 175.3 cm (5' 9\")  Most Recent Weight: 61.2 kg (135 lb)    IBW: 160 lbs  BMI: Normal BMI  Weight History:  No recent weight hx per chart review.  UBW:  140-142 lbs.  Based on admission weight, this would be a 5-7 lb weight loss.   Per pt he would like to gain some weight back.  Wt Readings from Last 10 Encounters:   07/07/18 61.2 kg (135 lb)   03/25/11 59.9 kg (132 lb)       Dosing Weight: 61 kg (current weight)    ASSESSED NUTRITION NEEDS  Estimated Energy Needs: 4188-0637 kcals/day (30 - 35 " kcals/kg )  Justification: Repletion  Estimated Protein Needs: 61-73 grams protein/day (1 - 1.2 grams of pro/kg)  Justification: Repletion  Estimated Fluid Needs: (1 mL/kcal)   Justification: Per provider pending fluid status    PHYSICAL FINDINGS  See malnutrition section below.  No peripheral edema     MALNUTRITION  % Intake: Decreased intake does not meet criteria  % Weight Loss: Up to 5% in less than 1 month (non-severe)  Subcutaneous Fat Loss: None observed  Muscle Loss: None observed  Fluid Accumulation/Edema: None noted  Malnutrition Diagnosis: Patient does not meet two of the above criteria necessary for diagnosing malnutrition    NUTRITION DIAGNOSIS  Predicted suboptimal nutrient intakes related to hx decreased po with substance use as evidenced by pt report of currently good intakes, but eating only every 2-3 days PTA with 3-5% weight loss over the past several weeks.      INTERVENTIONS  Implementation  Nutrition Education: Encouraged intakes of tid meals plus snacks between.  Discussed supplement options.     Goals  Patient to consume % of nutritionally adequate meal trays TID, or the equivalent with supplements/snacks.     Monitoring/Evaluation  Progress toward goals will be monitored and evaluated per protocol.    Amparo Harvey RD, LD

## 2018-07-09 NOTE — PROGRESS NOTES
Pt has a bed waiting at [facility] for [employer] [facility].  Needs Rule 25. He is supposed to be doing paperwork now and leaving with desk.  Told him CTC will get to it tomorrow.  He wanted to know why not now.  Writer told him everyone is gone.

## 2018-07-09 NOTE — PROGRESS NOTES
Writer met with Pt.  To discuss aftercare plan.  He reports he has been to tx 5 xs.  He is getting very discouraged.  He has been very successful in program, sponsoring, speaking etc.  He has a sponsor who has recommended [address] program.  Gave him the literature and encouraged him to start working on intake paperwork to complete assessment.  He will call sober houses affiliated with [facility].  He has worries about money.  He has a little savings, but doesn't want to spend it on treatment if it is going to end up like this again.  He has lost his job and needs to start over again.

## 2018-07-10 PROCEDURE — 99232 SBSQ HOSP IP/OBS MODERATE 35: CPT | Performed by: PSYCHIATRY & NEUROLOGY

## 2018-07-10 PROCEDURE — 12800012 ZZH R&B CD MH INTERMEDIATE ADULT

## 2018-07-10 PROCEDURE — 25000132 ZZH RX MED GY IP 250 OP 250 PS 637: Performed by: PSYCHIATRY & NEUROLOGY

## 2018-07-10 PROCEDURE — 99207 ZZC CDG-MDM COMPONENT: MEETS LOW - DOWN CODED: CPT | Performed by: PSYCHIATRY & NEUROLOGY

## 2018-07-10 RX ADMIN — QUETIAPINE FUMARATE 100 MG: 100 TABLET ORAL at 22:03

## 2018-07-10 RX ADMIN — NICOTINE POLACRILEX 8 MG: 4 GUM, CHEWING ORAL at 20:31

## 2018-07-10 RX ADMIN — MULTIPLE VITAMINS W/ MINERALS TAB 1 TABLET: TAB at 08:50

## 2018-07-10 RX ADMIN — NICOTINE POLACRILEX 8 MG: 4 GUM, CHEWING ORAL at 08:50

## 2018-07-10 RX ADMIN — FOLIC ACID 1 MG: 1 TABLET ORAL at 08:50

## 2018-07-10 RX ADMIN — Medication 100 MG: at 08:50

## 2018-07-10 RX ADMIN — NICOTINE POLACRILEX 8 MG: 4 GUM, CHEWING ORAL at 16:44

## 2018-07-10 RX ADMIN — HYDROXYZINE HYDROCHLORIDE 25 MG: 25 TABLET ORAL at 22:03

## 2018-07-10 ASSESSMENT — ACTIVITIES OF DAILY LIVING (ADL)
ORAL_HYGIENE: INDEPENDENT
DRESS: INDEPENDENT
DRESS: STREET CLOTHES
ORAL_HYGIENE: INDEPENDENT
GROOMING: INDEPENDENT
GROOMING: INDEPENDENT
LAUNDRY: WITH SUPERVISION
LAUNDRY: WITH SUPERVISION

## 2018-07-10 NOTE — PROGRESS NOTES
"[facility], [place]   Psychiatric Progress Note  Hospital Day: 3        Interim History:   The patient's care was discussed with the treatment team during the daily team meeting and/or staff's chart notes were reviewed.  Staff report patient was having some tachycardia overnight. Still having withdrawal symptoms.    Upon interview, the patient reports to being tired. Feels better today though. Would like to leave when out of detox.    Psychiatric Symptoms: none    Medication side effects reported: none    Other issues reported by patient: none         Medications:       folic acid  1 mg Oral Daily     multivitamin, therapeutic   Oral Daily          Allergies:     Allergies   Allergen Reactions     Amoxicillin      Hydrocodone           Labs:     No results found for this or any previous visit (from the past 48 hour(s)).       Psychiatric Examination:     /76  Pulse 84  Temp 97.7  F (36.5  C)  Resp 16  Ht 1.753 m (5' 9\")  Wt 61.2 kg (135 lb)  SpO2 97%  BMI 19.94 kg/m2  Weight is 135 lbs 0 oz  Body mass index is 19.94 kg/(m^2).    Orthostatic Vitals     None            Appearance: awake, alert, adequately groomed and casually dressed  Attitude:  cooperative  Eye Contact:  good  Mood:  good  Affect:  appropriate and in normal range and mood congruent  Speech:  clear, coherent and normal prosody  Language: fluent and intact in English  Psychomotor, Gait, Musculoskeletal:  no evidence of tardive dyskinesia, dystonia, or tics and intact station, gait and muscle tone  Throught Process:  logical, linear and goal oriented  Associations:  no loose associations  Thought Content:  no evidence of suicidal ideation or homicidal ideation and no evidence of psychotic thought  Insight:  good  Judgement:  intact  Oriented to:  time, person, and place  Attention Span and Concentration:  intact  Recent and Remote Memory:  intact  Fund of Knowledge:  appropriate    Clinical Global " Impressions  First:  Considering your total clinical experience with this particular patient population, how severe are the patient's symptoms at this time?: 7 (07/09/18 2143)  Compared to the patient's condition at the START of treatment, this patient's condition is:: 4 (07/09/18 2143)  Most recent:  Considering your total clinical experience with this particular patient population, how severe are the patient's symptoms at this time?: 7 (07/09/18 2143)  Compared to the patient's condition at the START of treatment, this patient's condition is:: 4 (07/09/18 2143)    # Pain Assessment:  Current Pain Score 3/30/2011   Pain score (0-10) 0   Jorgito s pain level was assessed and he currently denies pain.             Precautions:     Behavioral Orders   Procedures     Code 1 - Restrict to Unit     Routine Programming     As clinically indicated     Status 15     Every 15 minutes.     Withdrawal precautions          Diagnoses:   Alcohol dependence with uncomplicated withdrawal         Assessment & Plan:     Target psychiatric symptoms and interventions:  1. Continue with MSSA for withdrawal  2. CTC will work with patient on referral to [facility]    Medical Problems and Treatments:  None    Behavioral/Psychological/Social:  Encourage unit programming      Disposition Plan   Reason for ongoing admission: requires detoxification from substance that poses a risk of bodily harm during withdrawal period  Discharge location: Chemical dependency treatment facility  Discharge Medications: not ordered  Follow-up Appointments: not scheduled  Legal Status: voluntary  Entered by: Lyle Warren on 7/10/2018 at 2:34 PM

## 2018-07-10 NOTE — PROGRESS NOTES
[facility], [place]   Psychiatric Progress Note  Hospital Day: 2        Interim History:   The patient's care was discussed with the treatment team during the daily team meeting and/or staff's chart notes were reviewed.  Staff report patient is hopeful he can get into treatment.    Upon interview, the patient states that he wants to go to [facility] because his sponsor reported good results with other sponsees. We discussed some medication options for cravings or perhaps some Antabuse, however patient believed that treatment at [facility] with his other medications (that he stopped taking) will be helpful.    Psychiatric Symptoms: none    Medication side effects reported: none    Other issues reported by patient: none         Medications:       folic acid  1 mg Oral Daily     multivitamin, therapeutic   Oral Daily     thiamine  100 mg Oral Daily          Allergies:     Allergies   Allergen Reactions     Amoxicillin      Hydrocodone           Labs:     Recent Results (from the past 48 hour(s))   CBC with platelets    Collection Time: 07/08/18  8:02 AM   Result Value Ref Range    WBC 6.4 4.0 - 11.0 10e9/L    RBC Count 4.79 4.4 - 5.9 10e12/L    Hemoglobin 15.7 13.3 - 17.7 g/dL    Hematocrit 46.1 40.0 - 53.0 %    MCV 96 78 - 100 fl    MCH 32.8 26.5 - 33.0 pg    MCHC 34.1 31.5 - 36.5 g/dL    RDW 13.1 10.0 - 15.0 %    Platelet Count 169 150 - 450 10e9/L   GGT    Collection Time: 07/08/18  8:02 AM   Result Value Ref Range    GGT 47 0 - 75 U/L   Comprehensive metabolic panel    Collection Time: 07/08/18  8:02 AM   Result Value Ref Range    Sodium 144 133 - 144 mmol/L    Potassium 4.2 3.4 - 5.3 mmol/L    Chloride 109 94 - 109 mmol/L    Carbon Dioxide 29 20 - 32 mmol/L    Anion Gap 6 3 - 14 mmol/L    Glucose 96 70 - 99 mg/dL    Urea Nitrogen 14 7 - 30 mg/dL    Creatinine 0.78 0.66 - 1.25 mg/dL    GFR Estimate >90 >60 mL/min/1.7m2    GFR Estimate If Black >90 >60 mL/min/1.7m2    Calcium 8.5 8.5 - 10.1 mg/dL  "   Bilirubin Total 0.6 0.2 - 1.3 mg/dL    Albumin 3.1 (L) 3.4 - 5.0 g/dL    Protein Total 6.2 (L) 6.8 - 8.8 g/dL    Alkaline Phosphatase 84 40 - 150 U/L    ALT 49 0 - 70 U/L    AST 36 0 - 45 U/L   Lipid panel    Collection Time: 07/08/18  8:02 AM   Result Value Ref Range    Cholesterol 201 (H) <200 mg/dL    Triglycerides 143 <150 mg/dL    HDL Cholesterol 86 >39 mg/dL    LDL Cholesterol Calculated 86 <100 mg/dL    Non HDL Cholesterol 115 <130 mg/dL   TSH with free T4 reflex and/or T3 as indicated    Collection Time: 07/08/18  8:02 AM   Result Value Ref Range    TSH 0.47 0.40 - 4.00 mU/L   Vitamin B12    Collection Time: 07/08/18  8:02 AM   Result Value Ref Range    Vitamin B12 446 193 - 986 pg/mL          Psychiatric Examination:     BP (!) 143/91 (BP Location: Left arm)  Pulse 82  Temp 96.9  F (36.1  C) (Oral)  Resp 16  Ht 1.753 m (5' 9\")  Wt 61.2 kg (135 lb)  SpO2 97%  BMI 19.94 kg/m2  Weight is 135 lbs 0 oz  Body mass index is 19.94 kg/(m^2).    Orthostatic Vitals     None            Appearance: awake, alert, adequately groomed and casually dressed  Attitude:  cooperative  Eye Contact:  good  Mood:  good  Affect:  appropriate and in normal range and mood congruent  Speech:  clear, coherent and normal prosody  Language: fluent and intact in English  Psychomotor, Gait, Musculoskeletal:  no evidence of tardive dyskinesia, dystonia, or tics and intact station, gait and muscle tone  Throught Process:  logical, linear and goal oriented  Associations:  no loose associations  Thought Content:  no evidence of suicidal ideation or homicidal ideation and no evidence of psychotic thought  Insight:  good  Judgement:  intact  Oriented to:  time, person, and place  Attention Span and Concentration:  intact  Recent and Remote Memory:  intact  Fund of Knowledge:  appropriate    Clinical Global Impressions  First:  Considering your total clinical experience with this particular patient population, how severe are the " patient's symptoms at this time?: 7 (07/09/18 2143)  Compared to the patient's condition at the START of treatment, this patient's condition is:: 4 (07/09/18 2143)  Most recent:  Considering your total clinical experience with this particular patient population, how severe are the patient's symptoms at this time?: 7 (07/09/18 2143)  Compared to the patient's condition at the START of treatment, this patient's condition is:: 4 (07/09/18 2143)    # Pain Assessment:  Current Pain Score 3/30/2011   Pain score (0-10) 0   Jorgito s pain level was assessed and he currently denies pain.             Precautions:     Behavioral Orders   Procedures     Code 1 - Restrict to Unit     Routine Programming     As clinically indicated     Status 15     Every 15 minutes.     Withdrawal precautions          Diagnoses:   Alcohol dependence with uncomplicated withdrawal         Assessment & Plan:     Target psychiatric symptoms and interventions:  1. Continue with [facility] for withdrawal  2. [facility] will work with patient on referral [facility]    Medical Problems and Treatments:  None    Behavioral/Psychological/Social:  Encourage unit programming      Disposition Plan   Reason for ongoing admission: requires detoxification from substance that poses a risk of bodily harm during withdrawal period  Discharge location: [facility] facility  Discharge Medications: not ordered  Follow-up Appointments: not scheduled  Legal Status: voluntary  Entered by: Lyle Warren on 7/9/2018 at 9:43 PM
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
## 2018-07-10 NOTE — PROGRESS NOTES
Case Management Note  7/10/2018    Writer met with pt to review completed intake paperwork. Rule 25 assessment completed. Writer faxed Rule 25 assessment to [facility]. Writer called to verify assessment was received. [facility] confirmed assessment had been received. Justinr gave [facility] the 3A nurses station phone number to call and speak with pt. Pt is scheduled to discharge 3A on Wednesday, 7/11/18 to admit to a sober house and start in [facility] OP treatment program in [place], [place]. Case management complete.    Matteo Florence MA, [facility]

## 2018-07-11 VITALS
RESPIRATION RATE: 16 BRPM | TEMPERATURE: 97.5 F | HEART RATE: 97 BPM | WEIGHT: 135 LBS | BODY MASS INDEX: 19.99 KG/M2 | DIASTOLIC BLOOD PRESSURE: 93 MMHG | HEIGHT: 69 IN | SYSTOLIC BLOOD PRESSURE: 146 MMHG | OXYGEN SATURATION: 97 %

## 2018-07-11 PROCEDURE — 25000132 ZZH RX MED GY IP 250 OP 250 PS 637: Performed by: PSYCHIATRY & NEUROLOGY

## 2018-07-11 PROCEDURE — 99239 HOSP IP/OBS DSCHRG MGMT >30: CPT | Performed by: PSYCHIATRY & NEUROLOGY

## 2018-07-11 RX ORDER — QUETIAPINE FUMARATE 100 MG/1
100 TABLET, FILM COATED ORAL
Qty: 30 TABLET | Refills: 0 | Status: SHIPPED | OUTPATIENT
Start: 2018-07-11

## 2018-07-11 RX ADMIN — MULTIPLE VITAMINS W/ MINERALS TAB 1 TABLET: TAB at 08:01

## 2018-07-11 RX ADMIN — NICOTINE POLACRILEX 8 MG: 4 GUM, CHEWING ORAL at 08:03

## 2018-07-11 RX ADMIN — FOLIC ACID 1 MG: 1 TABLET ORAL at 08:01

## 2018-07-11 ASSESSMENT — ACTIVITIES OF DAILY LIVING (ADL)
DRESS: STREET CLOTHES;INDEPENDENT
ORAL_HYGIENE: INDEPENDENT
LAUNDRY: WITH SUPERVISION
GROOMING: INDEPENDENT

## 2018-07-11 NOTE — PROGRESS NOTES
Pt given copy of their discharge instructions and medication administration instructions. All discharge plans and labs were discussed with patient. Pt reports no questions at this time regarding discharge plans, labs or medications. Pt denies any suicidal ideation, plans or intent at this time. Patient discharge assisted via [facility] directly to home. Patient plan is to participate in outpatient treatment at [facility] with sober housing and attend AA meetings. Patient is discharged at this time.

## 2018-07-11 NOTE — DISCHARGE INSTRUCTIONS
Behavioral Discharge Planning and Instructions  THANK YOU FOR CHOOSING THE [facility]  3A  [phone]    Summary: You were admitted to Station 3A on 7/6/2018 for detoxification from alcohol.  A medical exam was performed that included lab work. You have met with a  and opted to begin [facility]'s OP Treatment Program with sober housing in [place], [place].  Please take care and make your recovery a daily priority, Jorgito! It was a pleasure working with you and the treatment team wishes you the very best in your recovery! ~3A Treatment Team    Recommendation:  Outpatient Treatment, with sober housing, psychotherapy, sober support group engagement and active work with a sponsor or  through [facility] (see below for contact information).    Main Diagnosis:  Per Dr. Arteaga;  Alcohol use disorder severe    Major Treatments, Procedures and Findings:  You were treated for alcohol detoxification using valium administered based on the [facility] protocol. You completed a chemical dependency assessment on 07/10/2018 with your , Matteo. You had labs drawn and those results were reviewed with you. Please take a copy of your lab work with you to your next primary care physician appointment.    Symptoms to Report:  If you experience more anxiety, confusion, sleeplessness, deep sadness or thoughts of suicide, notify your treatment team or notify your primary care physician. IF ANY OF THE SYMPTOMS YOU ARE EXPERIENCING ARE A MEDICAL EMERGENCY CALL 911 IMMEDIATELY.     Lifestyle Adjustment: Adjust your lifestyle to get enough sleep, relaxation, exercise and good nutrition. Continue to develop healthy coping skills to decrease stress and promote a sober living environment. Do not use mood altering substances including alcohol, illegal drugs or addictive medications other than what is currently prescribed.   Health Action Plan:  1.Create a daily schedule  2. Eat Healthy  3.  Plan Enjoyable Sober Activities  4. Use Problem Solving Skills and Deal with Issues as they Arise.   5. Be Physically Active  6. Take your medications as prescribed  7. Get enough restful sleep  8. Practice Relaxation  9. Spend time with Supportive People  10. No use of alcohol, illegal drugs or addictive medications other than what is currently prescribed.   11.AA, NA Sponsor are excellent resources for support      Disposition: Pt discharged to [facility] Program with sober housing.    Follow-up Appointment:   Please make a follow up appointment to get a new order from your primary care provider if any prescriptions need to be refilled. We only send you home with a 30 day supply. You stated that you see Dr. Katz at [facility], his contact information is below:  Dr. Katz~ [address], [place] [address]  Phone: [phone]    Resources:   AA/NA and Sponsors are excellent resources for support and you can find one at any support group meeting.   SMART Recovery - self management for addiction recovery:  www.smartrecovery.org  http://www.aastpaul.org/?topic=8  http://[place].org/meetings/  http://www.[place].org/index.php/meeting-list-pdf  Pathways ~ A Health Crisis Resource & Support Center:  760.879.6797.  http://www.harmreduction.org  [facility] [phone]  Support Group:  AA/NA and Sponsor/support.  National [place] on Mental Illness (www.[place].guille.org): 288.367.4335 or 232-567-7420.  Alcoholics Anonymous (www.alcoholics-anonymous.org): Check your phone book for your local chapter.  Suicide Awareness Voices of Education (SAVE) (www.save.org): 607-596-TENR (8316)  National Suicide Prevention Line (www.mentalhealthmn.org): 920-457-GWKK (5091)  Mental Health Consumer/Survivor Network of [place] (www.mhcsn.net): 646.396.5005 or 697-668-9899  Mental Health Association of [place] (www.mentalhealth.org): 945.214.6188 or 540-796-1683   Substance Abuse and Mental  Health Services (www.[facility].gov)    [facility] ([facility])  [facility] connects people seeking recovery to resources that help foster and sustain long-term recovery.  Whether you are seeking resources for treatment, transportation, housing, job training, education, health care or other pathways to recovery, [facility] is a great place to start.  [phone].  www.[facility].org    General Medication Instructions:   See your medication sheet(s) for instructions.   Take all medicines as directed.  Make no changes unless your doctor suggests them.   Go to all your doctor visits.  Be sure to have all your required lab tests. This way, your medicines can be refilled on time.  Do not use any forms of alcohol.    Please Note:  If you have any questions at anytime after you are discharged please call the [facility] [place] detox unit 3AW unit at [phone].  [facility], Behavioral Intake [phone]  Please take this discharge folder with you to all your follow up appointments, it contains your lab results, diagnosis, medication list and discharge recommendations.      THANK YOU FOR CHOOSING THE [facility]

## 2018-07-11 NOTE — DISCHARGE SUMMARY
Psychiatric Discharge Summary    Jorgito Florence MRN# [id number]   Age: 38 year old YOB: 1980     Date of Admission:  7/6/2018  Date of Discharge:  7/11/2018  Admitting Physician:  Lyle Warren MD  Discharge Physician:  Lyle Warren MD         Event Leading to Hospitalization:   38-year-old man admitted for detoxification from alcohol.       See Admission note by Mau Olea MD on 7/8/18 for additional details.          Diagnoses:     Alcohol dependence in uncomplicated withdrawal.         Labs:     Results for orders placed or performed during the hospital encounter of 07/06/18   Drug abuse screen 6 urine (tox)   Result Value Ref Range    Amphetamine Qual Urine Negative NEG^Negative    Barbiturates Qual Urine Negative NEG^Negative    Benzodiazepine Qual Urine Negative NEG^Negative    Cannabinoids Qual Urine Negative NEG^Negative    Cocaine Qual Urine Negative NEG^Negative    Ethanol Qual Urine Positive (A) NEG^Negative    Opiates Qualitative Urine Negative NEG^Negative   CBC with platelets   Result Value Ref Range    WBC 6.4 4.0 - 11.0 10e9/L    RBC Count 4.79 4.4 - 5.9 10e12/L    Hemoglobin 15.7 13.3 - 17.7 g/dL    Hematocrit 46.1 40.0 - 53.0 %    MCV 96 78 - 100 fl    MCH 32.8 26.5 - 33.0 pg    MCHC 34.1 31.5 - 36.5 g/dL    RDW 13.1 10.0 - 15.0 %    Platelet Count 169 150 - 450 10e9/L   GGT   Result Value Ref Range    GGT 47 0 - 75 U/L   Comprehensive metabolic panel   Result Value Ref Range    Sodium 144 133 - 144 mmol/L    Potassium 4.2 3.4 - 5.3 mmol/L    Chloride 109 94 - 109 mmol/L    Carbon Dioxide 29 20 - 32 mmol/L    Anion Gap 6 3 - 14 mmol/L    Glucose 96 70 - 99 mg/dL    Urea Nitrogen 14 7 - 30 mg/dL    Creatinine 0.78 0.66 - 1.25 mg/dL    GFR Estimate >90 >60 mL/min/1.7m2    GFR Estimate If Black >90 >60 mL/min/1.7m2    Calcium 8.5 8.5 - 10.1 mg/dL    Bilirubin Total 0.6 0.2 - 1.3 mg/dL    Albumin 3.1 (L) 3.4 - 5.0 g/dL    Protein Total 6.2 (L) 6.8 - 8.8 g/dL    Alkaline  Phosphatase 84 40 - 150 U/L    ALT 49 0 - 70 U/L    AST 36 0 - 45 U/L   Lipid panel   Result Value Ref Range    Cholesterol 201 (H) <200 mg/dL    Triglycerides 143 <150 mg/dL    HDL Cholesterol 86 >39 mg/dL    LDL Cholesterol Calculated 86 <100 mg/dL    Non HDL Cholesterol 115 <130 mg/dL   TSH with free T4 reflex and/or T3 as indicated   Result Value Ref Range    TSH 0.47 0.40 - 4.00 mU/L   Vitamin B12   Result Value Ref Range    Vitamin B12 446 193 - 986 pg/mL   Internal Medicine Adult IP Consult for [facility] Detox on 3A: Patient to be seen: Routine within 24 hrs; Call back #: [id number]; co-manage effects of alcohol dependence; Consultant may enter orders: Yes    Narrative    Tonia Dodson PA-C     7/8/2018  9:01 AM    [facility]  Internal Medicine Consult     Jorgito Florence MRN# [id number]   Age: 38 year old YOB: 1980     Date of Admission: 7/6/2018  Date of Consult:  7/8/2018    Primary Care Provider: No Ref-Primary, Physician    Requesting Service: Psychiatry  Reason for Consult: alcohol abuse      SUBJECTIVE   CC:   Alcohol abuse   HPI:   Jorgito Florence is a 37yo male with a hx of alcohol abuse who was   admitted to [facility] 3A detox seeking detox from alcohol.   Patient has been drinking 1-2L of hard alcohol daily. He has a hx   of substance abuse and [place]. Pt denies any hx of seizures from withdrawal. He reports   his withdrawal symptoms are improving at this time. He reports   being frustrated with his relapses of alcohol. He denies any hx   of cardiac or pulmonary disease. No chest pain, sob, or dyspnea.   No fevers or chills. No abdominal pain, bowel or bladder   concerns.      Past Medical History:   Alcohol abuse  Tobacco abuse     Past Surgical History:    History reviewed. No pertinent surgical history.      Social History:   See HPI.      Family History:   Reviewed - non contributory     Allergies:     Allergies   Allergen  "Reactions     Amoxicillin      Hydrocodone          Medications:   Reviewed. Please see MAR     Review of Systems:   10 point ROS of systems including Constitutional, Eyes,   Respiratory, Cardiovascular, Gastroenterology, Genitourinary,   Integumentary, Muscularskeletal, Psychiatric were all negative   except for pertinent positives noted in my HPI.      OBJECTIVE   Physical Exam:   Vitals were reviewed  Blood pressure 104/57, pulse 53, temperature 97  F (36.1  C),   temperature source Oral, resp. rate 16, height 1.753 m (5' 9\"),   weight 61.2 kg (135 lb), SpO2 97 %.  General:alert, NAD, appears in mild withdrawal, pleasant and   cooperative  HEENT: MMM  Cardiovascular: RRR  Lungs:CTAB  Abdomen: + BS, soft with no distention and no tenderness   Vascular: no peripheral edema, distal pulses palpable  Neurologic: AAO X 3, no focal deficits, no tremors in UE  Skin: no jaundice, rashes, or lesions on exposed areas of skin         Data:    Labs reviewed     Assessment and Plan/Recommendations:   Jorgito Florence is a 37yo male with a hx of alcohol abuse who was   admitted to [address] detox seeking detox from alcohol.    Alcohol abuse and withdrawal. No hx of seizures from withdrawal.   Has been in and out of treatment several times. LFTs and Plt wnl.   Cont MSSA, thiamine and folic as per psychiatry, primary team.     Tobacco abuse. Smokes 1.5ppd. Cont nicorette gum.     Currently, medically stable and I will be happy to follow up and   see again for any intercurrent medical issues. Thank you for the   opportunity to be a part of this patient's care.      Tonia Dodson  Internal Medicine [facility] Hospitalist  [phone]  July 8, 2018       Alcohol breath test POCT   Result Value Ref Range    Alcohol Breath Test 0.316 (A) 0.00 - 0.01              Consults:   Internal medicine as noted above. Patient did have some tachycardia for which atenolol was given.         Hospital Course:   Jorgito Florence was admitted to [facility] " 3A with attending Lyle Warren MD as a voluntary patient. The patient was placed under status 15 (15 minute checks) to ensure patient safety.   MSSA protocol was initiated due to the patient's history of alcohol abuse and concern for withdrawal symptoms.    The patient had a mostly uneventful withdrawal except some tachycardia. He had no hallucinations seizures. He had planned to go to [facility] and stay in a sober facility at discharge.    # Discharge Pain Plan:   - Patient currently has NO PAIN and is not being prescribed pain medications on discharge.      Jorgito Florence was released to home. At the time of discharge Jorgito Florence was determined to not be a danger to himself or others.          Discharge Medications:     Current Discharge Medication List      START taking these medications    Details   QUEtiapine (SEROQUEL) 100 MG tablet Take 1 tablet (100 mg) by mouth nightly as needed (sleep)  Qty: 30 tablet, Refills: 0    Associated Diagnoses: Alcohol dependence with uncomplicated intoxication (H)         CONTINUE these medications which have NOT CHANGED    Details   Multiple Vitamin (ONE-A-DAY MENS) TABS Take 1 tablet by mouth daily                  Psychiatric Examination:   Appearance:  awake, alert, adequately groomed and casually dressed  Attitude:  cooperative  Eye Contact:  good  Mood:  good  Affect:  appropriate and in normal range and mood congruent  Speech:  clear, coherent and normal prosody  Psychomotor Behavior:  no evidence of tardive dyskinesia, dystonia, or tics  Thought Process:  logical, linear and goal oriented  Associations:  no loose associations  Thought Content:  no evidence of suicidal ideation or homicidal ideation and no evidence of psychotic thought  Insight:  good  Judgment:  intact  Oriented to:  time, person, and place  Attention Span and Concentration:  intact  Recent and Remote Memory:  intact  Language: Able to name objects, Able to repeat phrases and Able to read and write  Fund  of Knowledge: appropriate  Muscle Strength and Tone: normal  Gait and Station: Normal         Discharge Plan:   Patient discharged to stay in a sober living house and follow-up for CD treatment at [facility]    Attestation:  The patient has been seen and evaluated by me,  Lyle Warren MD  On the day of discharge, I saw the patient and performed the above examination, reviewed discharge medications, reviewed follow-up plan, and assessed safety for discharge. I spent greater than 30 minutes on these tasks.

## 2018-07-11 NOTE — PLAN OF CARE
Problem: Substance Withdrawal  Goal: Substance Withdrawal  Signs and symptoms of listed problems will be absent or manageable.   Outcome: Improving  Pt came out of detox at 2030.  MSSA scores of 4 and 3 this evening.  Pt had no complaints.  No other issues.  Will continue to monitor.

## 2018-09-20 ASSESSMENT — MIFFLIN-ST. JEOR: SCORE: 1534.49

## 2018-09-22 ENCOUNTER — ANESTHESIA - HEALTHEAST (OUTPATIENT)
Dept: SURGERY | Facility: AMBULATORY SURGERY CENTER | Age: 38
End: 2018-09-22

## 2018-09-25 ENCOUNTER — SURGERY - HEALTHEAST (OUTPATIENT)
Dept: SURGERY | Facility: AMBULATORY SURGERY CENTER | Age: 38
End: 2018-09-25

## 2018-09-25 ASSESSMENT — MIFFLIN-ST. JEOR: SCORE: 1534.49

## 2019-04-10 ENCOUNTER — OFFICE VISIT - HEALTHEAST (OUTPATIENT)
Dept: ADDICTION MEDICINE | Facility: CLINIC | Age: 39
End: 2019-04-10

## 2019-04-10 ENCOUNTER — AMBULATORY - HEALTHEAST (OUTPATIENT)
Dept: ADDICTION MEDICINE | Facility: CLINIC | Age: 39
End: 2019-04-10

## 2019-04-10 DIAGNOSIS — F10.20 ALCOHOL USE DISORDER, SEVERE, DEPENDENCE (H): ICD-10-CM

## 2019-04-11 ENCOUNTER — OFFICE VISIT - HEALTHEAST (OUTPATIENT)
Dept: ADDICTION MEDICINE | Facility: CLINIC | Age: 39
End: 2019-04-11

## 2019-04-11 DIAGNOSIS — F10.20 ALCOHOL USE DISORDER, SEVERE, DEPENDENCE (H): ICD-10-CM

## 2019-04-15 ENCOUNTER — AMBULATORY - HEALTHEAST (OUTPATIENT)
Dept: ADDICTION MEDICINE | Facility: CLINIC | Age: 39
End: 2019-04-15

## 2019-04-18 ENCOUNTER — OFFICE VISIT - HEALTHEAST (OUTPATIENT)
Dept: ADDICTION MEDICINE | Facility: CLINIC | Age: 39
End: 2019-04-18

## 2019-04-18 DIAGNOSIS — F10.20 ALCOHOL USE DISORDER, SEVERE, DEPENDENCE (H): ICD-10-CM

## 2019-04-22 ENCOUNTER — AMBULATORY - HEALTHEAST (OUTPATIENT)
Dept: ADDICTION MEDICINE | Facility: CLINIC | Age: 39
End: 2019-04-22

## 2019-04-25 ENCOUNTER — OFFICE VISIT - HEALTHEAST (OUTPATIENT)
Dept: ADDICTION MEDICINE | Facility: CLINIC | Age: 39
End: 2019-04-25

## 2019-04-25 DIAGNOSIS — F10.20 ALCOHOL USE DISORDER, SEVERE, DEPENDENCE (H): ICD-10-CM

## 2019-04-29 ENCOUNTER — AMBULATORY - HEALTHEAST (OUTPATIENT)
Dept: ADDICTION MEDICINE | Facility: CLINIC | Age: 39
End: 2019-04-29

## 2019-05-02 ENCOUNTER — OFFICE VISIT - HEALTHEAST (OUTPATIENT)
Dept: ADDICTION MEDICINE | Facility: CLINIC | Age: 39
End: 2019-05-02

## 2019-05-02 DIAGNOSIS — F10.20 ALCOHOL USE DISORDER, SEVERE, DEPENDENCE (H): ICD-10-CM

## 2019-05-06 ENCOUNTER — AMBULATORY - HEALTHEAST (OUTPATIENT)
Dept: ADDICTION MEDICINE | Facility: CLINIC | Age: 39
End: 2019-05-06

## 2019-05-09 ENCOUNTER — OFFICE VISIT - HEALTHEAST (OUTPATIENT)
Dept: ADDICTION MEDICINE | Facility: CLINIC | Age: 39
End: 2019-05-09

## 2019-05-09 DIAGNOSIS — F10.20 ALCOHOL USE DISORDER, SEVERE, DEPENDENCE (H): ICD-10-CM

## 2019-05-13 ENCOUNTER — AMBULATORY - HEALTHEAST (OUTPATIENT)
Dept: ADDICTION MEDICINE | Facility: CLINIC | Age: 39
End: 2019-05-13

## 2019-05-16 ENCOUNTER — OFFICE VISIT - HEALTHEAST (OUTPATIENT)
Dept: ADDICTION MEDICINE | Facility: CLINIC | Age: 39
End: 2019-05-16

## 2019-05-16 DIAGNOSIS — F10.20 ALCOHOL USE DISORDER, SEVERE, DEPENDENCE (H): ICD-10-CM

## 2019-05-21 ENCOUNTER — AMBULATORY - HEALTHEAST (OUTPATIENT)
Dept: ADDICTION MEDICINE | Facility: CLINIC | Age: 39
End: 2019-05-21

## 2019-05-23 ENCOUNTER — OFFICE VISIT - HEALTHEAST (OUTPATIENT)
Dept: ADDICTION MEDICINE | Facility: CLINIC | Age: 39
End: 2019-05-23

## 2019-05-23 DIAGNOSIS — F10.20 ALCOHOL USE DISORDER, SEVERE, DEPENDENCE (H): ICD-10-CM

## 2019-05-28 ENCOUNTER — AMBULATORY - HEALTHEAST (OUTPATIENT)
Dept: ADDICTION MEDICINE | Facility: CLINIC | Age: 39
End: 2019-05-28

## 2019-05-30 ENCOUNTER — OFFICE VISIT - HEALTHEAST (OUTPATIENT)
Dept: ADDICTION MEDICINE | Facility: CLINIC | Age: 39
End: 2019-05-30

## 2019-05-30 DIAGNOSIS — F10.20 ALCOHOL USE DISORDER, SEVERE, DEPENDENCE (H): ICD-10-CM

## 2019-06-05 ENCOUNTER — AMBULATORY - HEALTHEAST (OUTPATIENT)
Dept: ADDICTION MEDICINE | Facility: CLINIC | Age: 39
End: 2019-06-05

## 2019-06-06 ENCOUNTER — OFFICE VISIT - HEALTHEAST (OUTPATIENT)
Dept: ADDICTION MEDICINE | Facility: CLINIC | Age: 39
End: 2019-06-06

## 2019-06-06 DIAGNOSIS — F10.20 ALCOHOL USE DISORDER, SEVERE, DEPENDENCE (H): ICD-10-CM

## 2019-06-12 ENCOUNTER — AMBULATORY - HEALTHEAST (OUTPATIENT)
Dept: ADDICTION MEDICINE | Facility: CLINIC | Age: 39
End: 2019-06-12

## 2019-06-13 ENCOUNTER — COMMUNICATION - HEALTHEAST (OUTPATIENT)
Dept: ADDICTION MEDICINE | Facility: CLINIC | Age: 39
End: 2019-06-13

## 2019-06-19 ENCOUNTER — AMBULATORY - HEALTHEAST (OUTPATIENT)
Dept: ADDICTION MEDICINE | Facility: CLINIC | Age: 39
End: 2019-06-19

## 2019-06-19 ENCOUNTER — COMMUNICATION - HEALTHEAST (OUTPATIENT)
Dept: ADDICTION MEDICINE | Facility: CLINIC | Age: 39
End: 2019-06-19

## 2019-06-20 ENCOUNTER — OFFICE VISIT - HEALTHEAST (OUTPATIENT)
Dept: ADDICTION MEDICINE | Facility: CLINIC | Age: 39
End: 2019-06-20

## 2019-06-20 DIAGNOSIS — F10.20 ALCOHOL USE DISORDER, SEVERE, DEPENDENCE (H): ICD-10-CM

## 2019-06-26 ENCOUNTER — AMBULATORY - HEALTHEAST (OUTPATIENT)
Dept: ADDICTION MEDICINE | Facility: CLINIC | Age: 39
End: 2019-06-26

## 2019-06-27 ENCOUNTER — OFFICE VISIT - HEALTHEAST (OUTPATIENT)
Dept: ADDICTION MEDICINE | Facility: CLINIC | Age: 39
End: 2019-06-27

## 2019-06-27 DIAGNOSIS — F10.20 ALCOHOL USE DISORDER, SEVERE, DEPENDENCE (H): ICD-10-CM

## 2019-06-28 ENCOUNTER — AMBULATORY - HEALTHEAST (OUTPATIENT)
Dept: ADDICTION MEDICINE | Facility: CLINIC | Age: 39
End: 2019-06-28

## 2021-05-27 NOTE — PROGRESS NOTES
Weekly Progress Note  Jorgito Florence  1980  [id number]    D) Pt attended 1 groups this week with 0 absences. Patient attended 0 individual sessions this week.   A) Staff facilitated groups and reviewed tx progress. Assessed for [facility].   R) No VAP needed at this time.   Any significant events, defines as events that impact patients relationship with others inside and outside of treatment No    Indicate any changes or monitoring of physical or mental health problems No    Indicate involvement by any outside supports Yes    IAPP reviewed and modified as needed. NA  Pt working on the following dimensions:    Dimension #1 - Withdrawal Potential - Risk 0. Patient reports last use of alcohol on 7/7/19. Patient denies withdrawal symptoms.  Specific goals from treatment plan addressed this week:Maintain abstinence while attending Recovery Maintenance as a way of gaining awareness of your thoughts, feelings and aspirations for recovery. Report any relapses, if any, on any substances of abuse to staff immediately.   Effectiveness of strategies: Patient reports maintained abstinence.     Dimension #2 - Biomedical - Risk 0. Patient reports stable health. Patient has primary care provider. Patient is able to seek cares as needed.   Specific goals from treatment plan addressed this week:Get proper rest, nutrition, and exercise in order to promote good brain and body function. Inform staff immediately of any changes in your health that may affect your active participation in group therapy or attendance.  Effectiveness of strategies: The patient is not endorsing any emergent or new biomedical concerns. He appears to be fully functioning. He reports that he has been going to the gym and finding it to be good for his health.     Dimension #3 - Emotional/Behavioral/Cognitive - Risk 1. Patient reports depression and anxiety. Patient has mental health care provider. Patient has history of abuse. Patient denies current homicidal or suicidal  ideations.  Specific goals from treatment plan addressed this week:   Report to staff any changes in your mental health that may affect your attendance or participation in group therapy.   Effectiveness of strategies: The patient reports some frustration at work and jealousy within his relationship however is not endorsing anything that he finds unmanageable.     Dimension #4 - Treatment Acceptance/Resistance - Risk 0. Verbalizes a desire to live a life in recovery   Specific goals from treatment plan addressed this week:  Attend Recovery Maintenance groups Thursday from 9:30am to 11:30am and share thoughts, feelings and urges to use, as well as sober supports with staff and peers.  Effectiveness of strategies: Patient reports his motivation at this time is at a 10/10.     Dimension #5 - Relapse Potential - Risk 1.  Patient has some coping skills. Patient is able to identify triggers to relapse. Patient needs development of relapse prevention skills.  Specific goals from treatment plan addressed this week:  Dealing effectively with relapse triggers and stressors while building coping skills in order to handle life events without resorting to drug/alcohol use.   Effectiveness of strategies: The patient did not endorse any triggers or urges over the last week, he is applying his coping skills appropriately.     Dimension #6 - Recovery Environment - Risk 0. Patient is employed. Patient has stable housing. Patient reportxs sober support system. No current legals  Specific goals from treatment plan addressed this week:  Find 2 sober support groups you feel comfortable attending on a weekly basis and inform counselor how these meetings are impacting you.   Effectiveness of strategies:  The patient is engaged in sober support groups and works with a sponsor.     T) Treatment plan updated yes.  Patient notified and in agreement Yes.  Patient educated on recovery maintenance. Pt has completed 2 hours of Recovery Maintenance  aftercare.  Projected discharge date is 7/11/19. Current discharge plan is [facility].     Jodi Sosa [facility]  4/15/2019, 10:28 AM      Recovery Maintenance Aftercare  Psycho-Educational Curriculum  Date Attended  Mindfulness Curriculum  Date Attended    Self Care  4/11 Neurobiology of Mindfulness    Staying motivated in Long Term Recovery-h/o  Core Attitudes    Keeping it Simple-h/o  Daily practice    Relapse Prevention Quiz-h/o  Awareness    Prioritizing Sober Living-24hours a day h/o  Thoughts    Symptoms of Relapse-h/o  Emotions    Tools for Recovery-h/o  Physical body     Acceptance-group exercise  Intention    Finding gratitude  C.A.L.M.    Building sober habits  Clarity    Sober support groups  Optimism    Have a Relapse Prevention Plan-written  Happiness

## 2021-05-27 NOTE — PROGRESS NOTES
Jorgito Florence attended 2 hours of group therapy today.    Total group size of 4.    4/11/2019 12:22 PM Jodi Sosa

## 2021-05-27 NOTE — PROGRESS NOTES
"Addiction Services - Initial Services Plan     Patient  Name: Jorgito Florence  MRN: [id number]   : 1980  Admit Date: 04/10/19       Patient describes their immediate need: To learn recovery skills to prevent relapse.     Are there any immediate Safety Needs such as (physical, stability, mobility):  Pt is able to get medical care as needed. Pt denies immediate concerns.     Immediate Health Needs and Plan:   None    Vulnerable Adult: No     Issues to be addressed in the first sessions:   Orientation to program    Patient strengths and needs:   Strengths identified as \"Harworking, motivated, a leader, I've got a lot of knowledge about the recovery community.\"  Needs identified as \"I have some pretty poor eating and sleeping habits. Complacency when it comes to recovery.\"    Plan for patient for time between intake and completion of the treatment plan:   Attend all group therapy sessions as directed, complete all written and oral assignments as directed, and remain clean and sober. A relapse, if any, must be reported to staff immediately in order to ensure you are receiving the proper level of care. If you cannot attend a group therapy session you must call contact information provided in intake folder and leave a message before or during group hours.       Vulnerable Adult Review   [X] Review of the Facility Abuse Prevention plan was reviewed with the patient   [X] No Individual Abuse Plan is necessary   [ ] In addition to the Facility Abuse Prevention plan, an Individual Abuse Plan will be put in place       Staff Name/Title: Kendal Madison   Date: 4/10/2019  Time: 9:21 AM        "

## 2021-05-27 NOTE — PROGRESS NOTES
"Intake Note:     D) Jorgito Florence is a 39 y.o.  single White or  male who is referred to  via [employer] with funding from [facility]. Patient orientated x 3. Patient meets criteria for Alcohol Use Disorder, Severe, (F10.20) (303.90).  Patient appears appropriate for .   A) Met with patient for 45 minutes.  Completed intake assessment and preliminary paperwork. Patient was given and explained counselor & supervisor license number and contact info, Patient Bill of Rights, program rules/regulations, Program Abuse Prevention Plan, confidentiality & HIPPA policies, grievance procedure, presented ROIs, TB & HIV/AIDS policies & resources, and Vulnerable Adult policy.   Conducted Vulnerable Adult Assessment.   R)No special Vulnerable Adult needed at this time.  Patient signed and agreed to counselor & supervisor license number and contact info., Patient Bill of Rights, group rules/regulations, Program Abuse Prevention Plan, confidentiality & HIPPA policies, grievance procedure,  ROIs, TB & HIV/AIDS policies & resources, and Vulnerable Adult policy. Patient scored low risk on C-SSRS screen. Patient denied suicidal ideation/intent/plan/means at this time.     Opioid Use Disorder: No   Provided \"Options for Opioid Treatment in [place] and Overdose Prevention\" NA     Dimension #1 - Withdrawal Potential - Risk 0. Patient reports last use of alcohol on 7/7/19. Patient denies withdrawal symptoms.    Dimension #2 - Biomedical - Risk 0. Patient reports stable health. Patient has primary care provider. Patient is able to seek cares as needed.    Dimension #3 - Emotional , Behavioral and Cognitive - Risk 1. Patient reports depression and anxiety. Patient has mental health care provider. Patient has history of abuse. Patient denies current homicidal or suicidal ideations.     Dimension #4 - Readiness for Change - Risk 0. Patient verbalizes a desire to live a life in recovery.    Dimension #5 - Relapse Potential - Risk 1. " Patient has some coping skills. Patient is able to identify triggers to relapse. Patient needs development of relapse prevention skills.     Dimension #6 - Recovery Environment - Risk 0. Patient is employed. Patient has stable housing. Patient reportxs sober support system. No current legals.     T) Explained counselor & supervisor license number and contact info, Patient Bill of Rights, program rules/regulations, Program Abuse Prevention Plan, confidentiality & HIPPA policies, grievance procedure, presented ROIs, TB & HIV/AIDS policies & resources, and Vulnerable Adult policy. Patient expected to start group on 04/11/19.      Kendal Madison  4/10/2019, 10:03 AM

## 2021-05-27 NOTE — PROGRESS NOTES
Individual Treatment Plan    Patient  Name: Jorgito Florence  MRN: [id number]   : 1980  Admit Date: 4/10/2019  Date of Initial Service Plan: 4/10/2019  Tentative Discharge Date: 19    Diagnosis: Alcohol Use Disorder, Severe, (F10.20) (303.90)    Counselor: Kendal Madison      Dimension 1: Acute Intoxication/Withdrawal Potential, Risk level: 0    Problem Statement from Comprehensive Assessment: on 4/10/2019  Patient reports last use of alcohol on 19. Patient denies withdrawal symptoms.    Problem: Alcohol Use Disorder  Goal: Patient to maintain abstinence throughout outpatient treatment.   Must be reached to complete treatment? Yes  Methods/Strategies (must include amount and frequency):   1. Patient to report any substance/alcohol use to counselor to determine if any changes need to be made to address withdrawal symptoms.   2. Patient to complete any requested UAs.   Target Date: 19  Completion Date:     Dimension 2: Biomedical Conditions/Complications, Risk level: 0    Problem Statement from Comprehensive Assessment: on 4/10/2019  Patient reports stable health. Patient has primary care provider. Patient is able to seek cares as needed.    Problem: Stable health  Goal: Patient to maintain stable health throughout outpatient treatment.   Must be reached to complete treatment? No  Methods/Strategies (must include amount and frequency):   1. Patient to report any changes to physical health to counselor.   2. Patient to attend all scheduled doctor s appointments.   3. Patient to take medications as prescribed.   Target Date: 19  Completion Date:     Problem: Patient reports current tobacco use.   Goal: Patient to receive information about smoking cessation.   Must be reached to complete treatment? No  Methods/Strategies (must include amount and frequency):   1. Staff to provide patient with nicotine cessation information and help on how to quit use.   2. Patient to report any progress on  stopping nicotine use to staff.   Target Date: 07/09/19  Completion Date:     Dimension 3: Emotional/Behavioral/Cognitive, Risk level: 1    Problem Statement from Comprehensive Assessment: on 4/10/2019:  Patient reports depression and anxiety. Patient has mental health care provider. Patient has history of abuse. Patient denies current homicidal or suicidal ideations.     Problem: Depression and anxiety  Goal: Stable mental health  Must be reached to complete treatment? No  Methods/Strategies (must include amount and frequency):   1. Patient to begin using coping skills learned in therapeutic group (such as grounding, breathing, thought challenging, mindfulness, etc), and share in daily check-in any benefits or challenges that you experience using these skills.  Target Date: 07/09/19  Completion Date:       Dimension 4: Readiness to Change, Risk level 0    Problem Statement from Comprehensive Assessment: on 4/10/2019:  Patient verbalizes a desire to live a life in recovery.    Problem: Ongoing motivation  Goal: Patient to increase motivation towards recovery by participating in outpatient programming.   Must be reached to complete treatment? Yes  Methods/Strategies (must include amount and frequency):   1. Patient to attend 1, 2-hour groups per week and all scheduled 1:1s.  2. Patient will contact staff if unable to attend   Target Date: 07/09/19  Completion Date:     Dimension 5: Relapse/Continued Use/Continued Problem Potential, Risk level: 1    Problem Statement from Comprehensive Assessment: on 4/10/2019:  Patient has some coping skills. Patient is able to identify triggers to relapse. Patient needs development of relapse prevention skills.     Problem: Continued use.  Goal: Develop relapse prevention skills  Must be reached to complete treatment? Yes  Methods/Strategies (must include amount and frequency):   Patient to share in daily check-in any urges and addictive thinking to better understand his pattern of  use and to prevent relapse in the future.   Target Date: 07/09/19  Completion Date:     Dimension 6: Recovery Environment, Risk level: 0    Problem Statement from Comprehensive Assessment: on 4/10/2019:   Patient is employed. Patient has stable housing. Patient reportxs sober support system. No current legals.     Problem: Lacks daily structure.  Goal: Add activities to day.  Must be reached to complete treatment? Yes  Methods/Strategies (must include amount and frequency): Attend group as scheduled.  Target Date: 07/09/19  Completion Date:     Resources  Resources to which the patient is being referred for problems when problems are to be addressed concurrently by another provider:         By signing this document, I am acknowledging that I was actively and directly involved in the development of my treatment plan.           Patient  Signature_________________________________________         Date__________________        Staff Signature  Kendal Madison    Date: 4/10/2019,

## 2021-05-28 NOTE — PROGRESS NOTES
Jorgito Florence attended 2 hours of group therapy today.    Total group size of 3.    5/9/2019 12:03 PM Jodi Sosa
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
## 2021-05-28 NOTE — PROGRESS NOTES
Jorgito Florence attended 2 hours of group therapy today.    Total group size of 4.    4/25/2019 1:21 PM Selene Meier
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
## 2021-05-28 NOTE — PROGRESS NOTES
Jorgito Florence attended 2 hours of group therapy today.    Total group size of 6.    5/2/2019 4:47 PM Jodi Sosa

## 2021-05-29 NOTE — PROGRESS NOTES
Jorgito Florence attended 2 hours of group therapy today.    Total group size of 3.    6/20/2019 11:33 AM Cali Woodward
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
## 2021-05-29 NOTE — PROGRESS NOTES
Jorgito Florence attended 2 hours of group therapy today.    Total group size of 6.    5/30/2019 12:26 PM Cali Woodward
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
## 2021-05-29 NOTE — PROGRESS NOTES
Jorgito Florence attended 2 hours of group therapy today.    Total group size of 4.    6/6/2019 11:32 AM Cali Woodward
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
## 2021-05-29 NOTE — TELEPHONE ENCOUNTER
Staff called to contact patient patient because he did not attend group today. The patient is in Recovery Maintenance Program and attends group once a week on Thursdays.    DEE Jordan

## 2021-05-29 NOTE — PROGRESS NOTES
Jorgito Florence attended 2 hours of group therapy today.    Total group size of 3.    5/23/2019 11:53 AM Cali Woodward

## 2021-05-29 NOTE — TELEPHONE ENCOUNTER
Staff initiated contact with patient via telephone call to check on his wellbeing, as the patient did not attend group on 6/13/2019, as expected. A voice mail message was left with a request for the patient to call back.    DEE Jordan

## 2021-05-30 NOTE — PROGRESS NOTES
[facility] SUBSTANCE USE DISORDER  DISCHARGE SUMMARY            Name:  Jorgito Florence   :  Cali Woodward   Admit Date: 4/10/2019   Discharge Date: 2018   :  1980   Hours Completed: 22   Initial Diagnosis:  Alcohol Use Disorder, Severe   Final Diagnosis:  Alcohol Use Disorder, Mild   Discharge Address:    [address] [place] [address]   Funding Source:    [employer]     Discharge Type:  With Staff Approval (WSA)    Client was receiving residential services at the time of discharge:   No      Reasons for and circumstances of service termination:  Patient completed treatment       If program discharge status was At Staff Request, the license hernandez must identify the following:    Other interested parties conferred with: NA    Referrals provided: [facility]    Alternatives considered and attempted before deciding to discharge:  NA      Dimension/Course of Treatment/Individualized Care:   1.  Withdrawal Potential - Intake Risk level -  0 Discharge Risk level - 0  Narrative supporting risk description:  Patient reports last use of alcohol on 2018, and denies any withdrawal symptoms  Treatment plan goals and progress towards those goals:  Goal was to maintain abstinence throughout outpatient treatment: the patient reports abstinence throughout time in treatment; reported last date of use on 2018.     2.  Biomedical Conditions and Complications - Intake Risk level -  0 Discharge Risk level - 0  Narrative supporting risk description:  At intake, the patient reported stable physical health. Patient reports access to primary care provider, and is able to get help as needed  Treatment plan goals and progress towards those goals:  Goal was to maintain stable physical health throughout outpatient treatment: the patient reported stable health throughout time in treatment.     3.  Emotional/Behavioral/Cognitive Conditions and Complications - Intake Risk level -  1 Discharge  "Risk level - 1  Narrative supporting risk description:  At intake, the patient reported depression and anxiety.   Treatment plan goals and progress towards those goals:  Goal was to maintain stable mental health: the patient reports access to mental health care provider/services. The patient denies any current suicidal ideations     4.  Readiness for Change - Intake Risk level -  0 Discharge Risk level - 0  Narrative supporting risk description:  At intake, the patient verbalized a desire to live life in recovery  Treatment plan goals and progress towards those goals:  Goal was to increase motivation towards recovery by participating in outpatient programming: the patient completed treatment as expected and successfully graduated     5.  Relapse/Continued Use/Continued Problem Potential - Intake Risk level -  1 Discharge Risk level - 1  Narrative supporting risk description:  At intake, the patient reported some coping skills.  Treatment plan goals and progress towards those goals:  Goal was to develop relapse prevention skills: The patient is able to identify triggers. Patient has demonstrated ability to utilize appropriate relapse prevention skills to avoid relapse.     6.  Recovery Environment - Intake Risk level -  0 Discharge Risk level - 0  Narrative supporting risk description:  At intake, the patient reported stable housing, full-time employment, and a sober support system with no legal involvement.  Treatment plan goals and progress towards those goals:  Goal was to add activities to day: the patient reports involvement in sober support activities on a daily basis, including attending recovery meetings.     Strengths: identified as, \"Harworking, motivated, a leader, I've got a lot of knowledge about the recovery community.\"  Needs: identified as, \"I have some pretty poor eating and sleeping habits. Complacency when it comes to recovery.\"     Services Provided: Intake, assessment, treatment planning, " education, group discussion, film, lectures, 1x1 therapy, and recommendations at discharge.        Program Involvement: Good  Attendance: Good  Ability to relate in group/   Other program activities: Good  Assignment Completion: Excellent and Good  Overall Behavior: Good  Reported Family/Significant   Other Involvement: Good    Prognosis: Good      Recommendations       Attend 12 Step Meetings, Obtain/Retain 12 Step Program Sponsor, Identify and Maintain a Sober Social and Network of Friends    Mental Health Referral  Individual Therapy and Med Compliance      Physical Health Referral:  Follow recommendations of PCP         Counselor Name and Title:  Cali Woodward        Date:  6/28/2019  Time:  2:06 PM
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
## 2021-05-30 NOTE — PROGRESS NOTES
Jorgito Florence attended 2 hours of group today. The patient completed 22 hours of Recovery Maintenance and graduated from treatment today    Patients engagement in the group session: medium     Total group size: 4      Cali Woodward  6/27/2019, 3:04 PM

## 2021-06-02 VITALS — HEIGHT: 69 IN | WEIGHT: 142 LBS | BODY MASS INDEX: 21.03 KG/M2

## 2021-06-20 NOTE — ANESTHESIA PREPROCEDURE EVALUATION
Anesthesia Evaluation      Patient summary reviewed   No history of anesthetic complications     Airway   Mallampati: II  Neck ROM: full   Pulmonary - normal exam   (+) a smoker                         Cardiovascular - negative ROS and normal exam   Neuro/Psych    (+) depression, anxiety/panic attacks,     Endo/Other - negative ROS      GI/Hepatic/Renal - negative ROS   (-) GERD, impaired hepatic function, renal disease     Other findings:     Hx alcoholism - in early remission. No alcohol x 80 days.  Hx marijuana use - in early remission  Hx cocaine use - in sustained remission      Dental    (+) chipped                       Anesthesia Plan  Planned anesthetic: general LMA  Glycopyrrolate 0.2 mg IV  #4 LMA  Zofran/Decadron  ASA 3   Induction: intravenous   Anesthetic plan and risks discussed with: patient  Anesthesia plan special considerations: antiemetics,   Post-op plan: routine recovery

## 2021-06-20 NOTE — ANESTHESIA CARE TRANSFER NOTE
Last vitals:   Vitals:    09/25/18 0938   BP: 121/82   Pulse: (!) 103   Resp: 20   Temp: 36.9  C (98.5  F)   SpO2: 97%     Volatile agents turned off, pt breathing spontaneously with adequate tidal volumes, following commands, LMA removed without issue. Fully awake, able to transfer self to cart. Maintaining O2 sats on room air. VSS. Able to fast track to Phase II. Transported by CRNA and RN to recovery on room air.      Patient's level of consciousness is awake  Spontaneous respirations: yes  Maintains airway independently: yes  Dentition unchanged: yes  Oropharynx: oropharynx clear of all foreign objects    QCDR Measures:  ASA# 20 - Surgical Safety Checklist: WHO surgical safety checklist completed prior to induction  PQRS# 430 - Adult PONV Prevention: 4558F - Pt received => 2 anti-emetic agents (different classes) preop & intraop  ASA# 8 - Peds PONV Prevention: NA - Not pediatric patient, not GA or 2 or more risk factors NOT present  PQRS# 424 - Norma-op Temp Management: 4559F - At least one body temp DOCUMENTED => 35.5C or 95.9F within required timeframe  PQRS# 426 - PACU Transfer Protocol: - Transfer of care checklist used  ASA# 14 - Acute Post-op Pain: ASA14B - Patient did NOT experience pain >= 7 out of 10

## 2021-06-20 NOTE — ANESTHESIA POSTPROCEDURE EVALUATION
Patient: Jorgito Florence  BILATERAL SPERMATOCELECTOMY  Anesthesia type: general    Patient location: PACU  Last vitals:   Vitals:    09/25/18 1130   BP: 120/64   Pulse: 78   Resp: 16   Temp:    SpO2: 98%     Post vital signs: stable  Level of consciousness: awake and responds to simple questions  Post-anesthesia pain: pain controlled  Post-anesthesia nausea and vomiting: no  Pulmonary: unassisted, return to baseline  Cardiovascular: stable and blood pressure at baseline  Hydration: adequate  Anesthetic events: no    QCDR Measures:  ASA# 11 - Norma-op Cardiac Arrest: ASA11B - Patient did NOT experience unanticipated cardiac arrest  ASA# 12 - Norma-op Mortality Rate: ASA12B - Patient did NOT die  ASA# 13 - PACU Re-Intubation Rate: ASA13B - Patient did NOT require a new airway mgmt  ASA# 10 - Composite Anes Safety: ASA10A - No serious adverse event    Additional Notes:
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
## 2022-01-28 ENCOUNTER — LAB REQUISITION (OUTPATIENT)
Dept: LAB | Facility: CLINIC | Age: 42
End: 2022-01-28

## 2022-01-28 LAB — HBV SURFACE AB SERPL IA-ACNC: 127.22 M[IU]/ML

## 2022-01-28 PROCEDURE — 86481 TB AG RESPONSE T-CELL SUSP: CPT | Performed by: INTERNAL MEDICINE

## 2022-01-28 PROCEDURE — 86706 HEP B SURFACE ANTIBODY: CPT | Performed by: INTERNAL MEDICINE

## 2022-01-30 LAB
QUANTIFERON MITOGEN: 10 IU/ML
QUANTIFERON NIL TUBE: 0.07 IU/ML
QUANTIFERON TB1 TUBE: 0.11 IU/ML
QUANTIFERON TB2 TUBE: 0.16

## 2022-01-31 LAB
GAMMA INTERFERON BACKGROUND BLD IA-ACNC: 0.07 IU/ML
M TB IFN-G BLD-IMP: NEGATIVE
M TB IFN-G CD4+ BCKGRND COR BLD-ACNC: 9.93 IU/ML
MITOGEN IGNF BCKGRD COR BLD-ACNC: 0.04 IU/ML
MITOGEN IGNF BCKGRD COR BLD-ACNC: 0.09 IU/ML
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
## 2024-04-21 NOTE — PROGRESS NOTES
Jorgito Florence attended 2 hours of group therapy today.    Total group size of 6.    5/16/2019 12:23 PM Jodi Sosa  
No cyanosis, no pallor, no jaundice, no rash, scalp lac above